# Patient Record
Sex: FEMALE | Race: WHITE | NOT HISPANIC OR LATINO | URBAN - METROPOLITAN AREA
[De-identification: names, ages, dates, MRNs, and addresses within clinical notes are randomized per-mention and may not be internally consistent; named-entity substitution may affect disease eponyms.]

---

## 2017-04-21 ENCOUNTER — OUTPATIENT (OUTPATIENT)
Dept: OUTPATIENT SERVICES | Facility: HOSPITAL | Age: 47
LOS: 1 days | End: 2017-04-21
Payer: MEDICAID

## 2017-04-21 ENCOUNTER — APPOINTMENT (OUTPATIENT)
Dept: MAMMOGRAPHY | Facility: IMAGING CENTER | Age: 47
End: 2017-04-21

## 2017-04-21 DIAGNOSIS — K63.9 DISEASE OF INTESTINE, UNSPECIFIED: Chronic | ICD-10-CM

## 2017-04-21 DIAGNOSIS — Z00.8 ENCOUNTER FOR OTHER GENERAL EXAMINATION: ICD-10-CM

## 2017-04-21 DIAGNOSIS — M12.9 ARTHROPATHY, UNSPECIFIED: Chronic | ICD-10-CM

## 2017-04-21 PROCEDURE — 77063 BREAST TOMOSYNTHESIS BI: CPT

## 2017-04-21 PROCEDURE — 77067 SCR MAMMO BI INCL CAD: CPT

## 2017-05-15 ENCOUNTER — APPOINTMENT (OUTPATIENT)
Dept: OBGYN | Facility: CLINIC | Age: 47
End: 2017-05-15

## 2017-10-10 ENCOUNTER — OUTPATIENT (OUTPATIENT)
Dept: OUTPATIENT SERVICES | Facility: HOSPITAL | Age: 47
LOS: 1 days | End: 2017-10-10
Payer: MEDICAID

## 2017-10-10 ENCOUNTER — APPOINTMENT (OUTPATIENT)
Dept: OBGYN | Facility: CLINIC | Age: 47
End: 2017-10-10
Payer: MEDICAID

## 2017-10-10 ENCOUNTER — APPOINTMENT (OUTPATIENT)
Dept: ULTRASOUND IMAGING | Facility: IMAGING CENTER | Age: 47
End: 2017-10-10
Payer: MEDICAID

## 2017-10-10 ENCOUNTER — APPOINTMENT (OUTPATIENT)
Dept: VASCULAR SURGERY | Facility: CLINIC | Age: 47
End: 2017-10-10

## 2017-10-10 VITALS
DIASTOLIC BLOOD PRESSURE: 80 MMHG | WEIGHT: 170 LBS | HEIGHT: 67.5 IN | BODY MASS INDEX: 26.37 KG/M2 | SYSTOLIC BLOOD PRESSURE: 107 MMHG

## 2017-10-10 DIAGNOSIS — Z01.419 ENCOUNTER FOR GYNECOLOGICAL EXAMINATION (GENERAL) (ROUTINE) W/OUT ABNORMAL FINDINGS: ICD-10-CM

## 2017-10-10 DIAGNOSIS — Z87.42 PERSONAL HISTORY OF OTHER DISEASES OF THE FEMALE GENITAL TRACT: ICD-10-CM

## 2017-10-10 DIAGNOSIS — Z00.8 ENCOUNTER FOR OTHER GENERAL EXAMINATION: ICD-10-CM

## 2017-10-10 DIAGNOSIS — R10.30 LOWER ABDOMINAL PAIN, UNSPECIFIED: ICD-10-CM

## 2017-10-10 DIAGNOSIS — M12.9 ARTHROPATHY, UNSPECIFIED: Chronic | ICD-10-CM

## 2017-10-10 DIAGNOSIS — Z09 ENCOUNTER FOR FOLLOW-UP EXAMINATION AFTER COMPLETED TREATMENT FOR CONDITIONS OTHER THAN MALIGNANT NEOPLASM: ICD-10-CM

## 2017-10-10 DIAGNOSIS — N83.201 UNSPECIFIED OVARIAN CYST, RIGHT SIDE: ICD-10-CM

## 2017-10-10 DIAGNOSIS — K63.9 DISEASE OF INTESTINE, UNSPECIFIED: Chronic | ICD-10-CM

## 2017-10-10 LAB
BILIRUB UR QL STRIP: NORMAL
GLUCOSE UR-MCNC: NORMAL
HCG UR QL: 0.2 EU/DL
HGB UR QL STRIP.AUTO: NORMAL
KETONES UR-MCNC: NORMAL
LEUKOCYTE ESTERASE UR QL STRIP: NORMAL
NITRITE UR QL STRIP: NORMAL
PH UR STRIP: 5.5
PROT UR STRIP-MCNC: NORMAL
SP GR UR STRIP: <1.005

## 2017-10-10 PROCEDURE — 76830 TRANSVAGINAL US NON-OB: CPT | Mod: 26

## 2017-10-10 PROCEDURE — 81003 URINALYSIS AUTO W/O SCOPE: CPT | Mod: QW

## 2017-10-10 PROCEDURE — 76856 US EXAM PELVIC COMPLETE: CPT | Mod: 26

## 2017-10-10 PROCEDURE — 76830 TRANSVAGINAL US NON-OB: CPT

## 2017-10-10 PROCEDURE — 99396 PREV VISIT EST AGE 40-64: CPT

## 2017-10-10 PROCEDURE — 76856 US EXAM PELVIC COMPLETE: CPT

## 2017-10-10 RX ORDER — OMEPRAZOLE 40 MG/1
40 CAPSULE, DELAYED RELEASE ORAL
Refills: 0 | Status: ACTIVE | COMMUNITY

## 2017-10-10 RX ORDER — FOLIC ACID 1 MG/1
1 TABLET ORAL
Refills: 0 | Status: ACTIVE | COMMUNITY

## 2017-10-10 RX ORDER — MULTIVITAMIN
TABLET,CHEWABLE ORAL
Refills: 0 | Status: ACTIVE | COMMUNITY

## 2017-10-10 RX ORDER — SUCRALFATE 1 G/10ML
1 SUSPENSION ORAL
Refills: 0 | Status: ACTIVE | COMMUNITY

## 2017-10-11 LAB
ALBUMIN SERPL ELPH-MCNC: 4.4 G/DL
ALP BLD-CCNC: 64 U/L
ALT SERPL-CCNC: 16 U/L
ANION GAP SERPL CALC-SCNC: 16 MMOL/L
AST SERPL-CCNC: 24 U/L
BILIRUB SERPL-MCNC: 0.2 MG/DL
BUN SERPL-MCNC: 9 MG/DL
CALCIUM SERPL-MCNC: 9.6 MG/DL
CHLORIDE SERPL-SCNC: 103 MMOL/L
CO2 SERPL-SCNC: 22 MMOL/L
CREAT SERPL-MCNC: 0.74 MG/DL
GLUCOSE SERPL-MCNC: 80 MG/DL
POTASSIUM SERPL-SCNC: 4.1 MMOL/L
PROT SERPL-MCNC: 7 G/DL
SODIUM SERPL-SCNC: 141 MMOL/L

## 2017-10-13 LAB
CYTOLOGY CVX/VAG DOC THIN PREP: NORMAL
HPV HIGH+LOW RISK DNA PNL CVX: NOT DETECTED

## 2017-10-30 ENCOUNTER — APPOINTMENT (OUTPATIENT)
Dept: SURGERY | Facility: CLINIC | Age: 47
End: 2017-10-30

## 2017-11-03 ENCOUNTER — APPOINTMENT (OUTPATIENT)
Dept: OBGYN | Facility: CLINIC | Age: 47
End: 2017-11-03

## 2017-11-30 ENCOUNTER — OTHER (OUTPATIENT)
Age: 47
End: 2017-11-30

## 2019-03-11 ENCOUNTER — INPATIENT (INPATIENT)
Facility: HOSPITAL | Age: 49
LOS: 0 days | Discharge: HOME | DRG: 389 | End: 2019-03-12
Attending: SURGERY | Admitting: SURGERY
Payer: MEDICAID

## 2019-03-11 VITALS
DIASTOLIC BLOOD PRESSURE: 79 MMHG | OXYGEN SATURATION: 99 % | WEIGHT: 166.89 LBS | SYSTOLIC BLOOD PRESSURE: 119 MMHG | TEMPERATURE: 98 F | RESPIRATION RATE: 20 BRPM | HEART RATE: 78 BPM

## 2019-03-11 DIAGNOSIS — K56.1 INTUSSUSCEPTION: ICD-10-CM

## 2019-03-11 DIAGNOSIS — M12.9 ARTHROPATHY, UNSPECIFIED: Chronic | ICD-10-CM

## 2019-03-11 DIAGNOSIS — K63.9 DISEASE OF INTESTINE, UNSPECIFIED: Chronic | ICD-10-CM

## 2019-03-11 LAB
ALBUMIN SERPL ELPH-MCNC: 4.5 G/DL — SIGNIFICANT CHANGE UP (ref 3.3–5)
ALP SERPL-CCNC: 53 U/L — SIGNIFICANT CHANGE UP (ref 40–120)
ALT FLD-CCNC: 17 U/L — SIGNIFICANT CHANGE UP (ref 10–45)
ANION GAP SERPL CALC-SCNC: 13 MMOL/L — SIGNIFICANT CHANGE UP (ref 5–17)
APPEARANCE UR: CLEAR — SIGNIFICANT CHANGE UP
APTT BLD: 32.5 SEC — SIGNIFICANT CHANGE UP (ref 27.5–36.3)
AST SERPL-CCNC: 21 U/L — SIGNIFICANT CHANGE UP (ref 10–40)
BACTERIA # UR AUTO: NEGATIVE — SIGNIFICANT CHANGE UP
BASE EXCESS BLDV CALC-SCNC: 2.2 MMOL/L — HIGH (ref -2–2)
BASOPHILS # BLD AUTO: 0.1 K/UL — SIGNIFICANT CHANGE UP (ref 0–0.2)
BASOPHILS NFR BLD AUTO: 0.6 % — SIGNIFICANT CHANGE UP (ref 0–2)
BILIRUB SERPL-MCNC: 0.4 MG/DL — SIGNIFICANT CHANGE UP (ref 0.2–1.2)
BILIRUB UR-MCNC: NEGATIVE — SIGNIFICANT CHANGE UP
BLD GP AB SCN SERPL QL: NEGATIVE — SIGNIFICANT CHANGE UP
BUN SERPL-MCNC: 9 MG/DL — SIGNIFICANT CHANGE UP (ref 7–23)
CA-I SERPL-SCNC: 1.24 MMOL/L — SIGNIFICANT CHANGE UP (ref 1.12–1.3)
CALCIUM SERPL-MCNC: 10.6 MG/DL — HIGH (ref 8.4–10.5)
CHLORIDE BLDV-SCNC: 104 MMOL/L — SIGNIFICANT CHANGE UP (ref 96–108)
CHLORIDE SERPL-SCNC: 100 MMOL/L — SIGNIFICANT CHANGE UP (ref 96–108)
CO2 BLDV-SCNC: 29 MMOL/L — SIGNIFICANT CHANGE UP (ref 22–30)
CO2 SERPL-SCNC: 27 MMOL/L — SIGNIFICANT CHANGE UP (ref 22–31)
COLOR SPEC: COLORLESS — SIGNIFICANT CHANGE UP
CREAT SERPL-MCNC: 0.6 MG/DL — SIGNIFICANT CHANGE UP (ref 0.5–1.3)
DIFF PNL FLD: NEGATIVE — SIGNIFICANT CHANGE UP
EOSINOPHIL # BLD AUTO: 0.1 K/UL — SIGNIFICANT CHANGE UP (ref 0–0.5)
EOSINOPHIL NFR BLD AUTO: 1.4 % — SIGNIFICANT CHANGE UP (ref 0–6)
EPI CELLS # UR: 0 /HPF — SIGNIFICANT CHANGE UP
GAS PNL BLDV: 136 MMOL/L — SIGNIFICANT CHANGE UP (ref 136–145)
GAS PNL BLDV: SIGNIFICANT CHANGE UP
GLUCOSE BLDV-MCNC: 68 MG/DL — LOW (ref 70–99)
GLUCOSE SERPL-MCNC: 99 MG/DL — SIGNIFICANT CHANGE UP (ref 70–99)
GLUCOSE UR QL: NEGATIVE — SIGNIFICANT CHANGE UP
HCG SERPL-ACNC: <2 MIU/ML — SIGNIFICANT CHANGE UP
HCG UR QL: NEGATIVE — SIGNIFICANT CHANGE UP
HCO3 BLDV-SCNC: 27 MMOL/L — SIGNIFICANT CHANGE UP (ref 21–29)
HCT VFR BLD CALC: 47 % — HIGH (ref 34.5–45)
HCT VFR BLDA CALC: 40 % — SIGNIFICANT CHANGE UP (ref 39–50)
HGB BLD CALC-MCNC: 13.2 G/DL — SIGNIFICANT CHANGE UP (ref 11.5–15.5)
HGB BLD-MCNC: 15.8 G/DL — HIGH (ref 11.5–15.5)
HYALINE CASTS # UR AUTO: 1 /LPF — SIGNIFICANT CHANGE UP (ref 0–2)
INR BLD: 1.13 RATIO — SIGNIFICANT CHANGE UP (ref 0.88–1.16)
KETONES UR-MCNC: SIGNIFICANT CHANGE UP
LACTATE BLDV-MCNC: 2.8 MMOL/L — HIGH (ref 0.7–2)
LEUKOCYTE ESTERASE UR-ACNC: NEGATIVE — SIGNIFICANT CHANGE UP
LIDOCAIN IGE QN: 22 U/L — SIGNIFICANT CHANGE UP (ref 7–60)
LYMPHOCYTES # BLD AUTO: 2.8 K/UL — SIGNIFICANT CHANGE UP (ref 1–3.3)
LYMPHOCYTES # BLD AUTO: 27 % — SIGNIFICANT CHANGE UP (ref 13–44)
MCHC RBC-ENTMCNC: 29.4 PG — SIGNIFICANT CHANGE UP (ref 27–34)
MCHC RBC-ENTMCNC: 33.6 GM/DL — SIGNIFICANT CHANGE UP (ref 32–36)
MCV RBC AUTO: 87.5 FL — SIGNIFICANT CHANGE UP (ref 80–100)
MONOCYTES # BLD AUTO: 0.9 K/UL — SIGNIFICANT CHANGE UP (ref 0–0.9)
MONOCYTES NFR BLD AUTO: 8.9 % — SIGNIFICANT CHANGE UP (ref 2–14)
NEUTROPHILS # BLD AUTO: 6.5 K/UL — SIGNIFICANT CHANGE UP (ref 1.8–7.4)
NEUTROPHILS NFR BLD AUTO: 62.1 % — SIGNIFICANT CHANGE UP (ref 43–77)
NITRITE UR-MCNC: NEGATIVE — SIGNIFICANT CHANGE UP
PCO2 BLDV: 46 MMHG — SIGNIFICANT CHANGE UP (ref 35–50)
PH BLDV: 7.38 — SIGNIFICANT CHANGE UP (ref 7.35–7.45)
PH UR: 6.5 — SIGNIFICANT CHANGE UP (ref 5–8)
PLATELET # BLD AUTO: 188 K/UL — SIGNIFICANT CHANGE UP (ref 150–400)
PO2 BLDV: 28 MMHG — SIGNIFICANT CHANGE UP (ref 25–45)
POTASSIUM BLDV-SCNC: 3.7 MMOL/L — SIGNIFICANT CHANGE UP (ref 3.5–5.3)
POTASSIUM SERPL-MCNC: 4.1 MMOL/L — SIGNIFICANT CHANGE UP (ref 3.5–5.3)
POTASSIUM SERPL-SCNC: 4.1 MMOL/L — SIGNIFICANT CHANGE UP (ref 3.5–5.3)
PROT SERPL-MCNC: 7.2 G/DL — SIGNIFICANT CHANGE UP (ref 6–8.3)
PROT UR-MCNC: NEGATIVE — SIGNIFICANT CHANGE UP
PROTHROM AB SERPL-ACNC: 12.9 SEC — SIGNIFICANT CHANGE UP (ref 10–12.9)
RBC # BLD: 5.37 M/UL — HIGH (ref 3.8–5.2)
RBC # FLD: 12.3 % — SIGNIFICANT CHANGE UP (ref 10.3–14.5)
RBC CASTS # UR COMP ASSIST: 1 /HPF — SIGNIFICANT CHANGE UP (ref 0–4)
RH IG SCN BLD-IMP: POSITIVE — SIGNIFICANT CHANGE UP
SAO2 % BLDV: 49 % — LOW (ref 67–88)
SODIUM SERPL-SCNC: 140 MMOL/L — SIGNIFICANT CHANGE UP (ref 135–145)
SP GR SPEC: 1.04 — HIGH (ref 1.01–1.02)
UROBILINOGEN FLD QL: NEGATIVE — SIGNIFICANT CHANGE UP
WBC # BLD: 10.4 K/UL — SIGNIFICANT CHANGE UP (ref 3.8–10.5)
WBC # FLD AUTO: 10.4 K/UL — SIGNIFICANT CHANGE UP (ref 3.8–10.5)
WBC UR QL: 1 /HPF — SIGNIFICANT CHANGE UP (ref 0–5)

## 2019-03-11 PROCEDURE — 99285 EMERGENCY DEPT VISIT HI MDM: CPT

## 2019-03-11 PROCEDURE — 74177 CT ABD & PELVIS W/CONTRAST: CPT | Mod: 26

## 2019-03-11 RX ORDER — SUCRALFATE 1 G
1 TABLET ORAL EVERY 6 HOURS
Qty: 0 | Refills: 0 | Status: DISCONTINUED | OUTPATIENT
Start: 2019-03-11 | End: 2019-03-12

## 2019-03-11 RX ORDER — MORPHINE SULFATE 50 MG/1
2 CAPSULE, EXTENDED RELEASE ORAL ONCE
Qty: 0 | Refills: 0 | Status: DISCONTINUED | OUTPATIENT
Start: 2019-03-11 | End: 2019-03-12

## 2019-03-11 RX ORDER — SODIUM CHLORIDE 9 MG/ML
1000 INJECTION, SOLUTION INTRAVENOUS ONCE
Qty: 0 | Refills: 0 | Status: COMPLETED | OUTPATIENT
Start: 2019-03-11 | End: 2019-03-11

## 2019-03-11 RX ORDER — PANTOPRAZOLE SODIUM 20 MG/1
40 TABLET, DELAYED RELEASE ORAL ONCE
Qty: 0 | Refills: 0 | Status: COMPLETED | OUTPATIENT
Start: 2019-03-11 | End: 2019-03-11

## 2019-03-11 RX ORDER — MORPHINE SULFATE 50 MG/1
4 CAPSULE, EXTENDED RELEASE ORAL ONCE
Qty: 0 | Refills: 0 | Status: DISCONTINUED | OUTPATIENT
Start: 2019-03-11 | End: 2019-03-11

## 2019-03-11 RX ORDER — LIDOCAINE 4 G/100G
15 CREAM TOPICAL ONCE
Qty: 0 | Refills: 0 | Status: COMPLETED | OUTPATIENT
Start: 2019-03-11 | End: 2019-03-11

## 2019-03-11 RX ORDER — ENOXAPARIN SODIUM 100 MG/ML
40 INJECTION SUBCUTANEOUS DAILY
Qty: 0 | Refills: 0 | Status: DISCONTINUED | OUTPATIENT
Start: 2019-03-11 | End: 2019-03-12

## 2019-03-11 RX ORDER — PANTOPRAZOLE SODIUM 20 MG/1
40 TABLET, DELAYED RELEASE ORAL DAILY
Qty: 0 | Refills: 0 | Status: DISCONTINUED | OUTPATIENT
Start: 2019-03-11 | End: 2019-03-12

## 2019-03-11 RX ORDER — SODIUM CHLORIDE 9 MG/ML
1000 INJECTION, SOLUTION INTRAVENOUS
Qty: 0 | Refills: 0 | Status: DISCONTINUED | OUTPATIENT
Start: 2019-03-11 | End: 2019-03-12

## 2019-03-11 RX ADMIN — Medication 30 MILLILITER(S): at 22:46

## 2019-03-11 RX ADMIN — PANTOPRAZOLE SODIUM 40 MILLIGRAM(S): 20 TABLET, DELAYED RELEASE ORAL at 21:19

## 2019-03-11 RX ADMIN — SODIUM CHLORIDE 1000 MILLILITER(S): 9 INJECTION, SOLUTION INTRAVENOUS at 21:20

## 2019-03-11 RX ADMIN — MORPHINE SULFATE 4 MILLIGRAM(S): 50 CAPSULE, EXTENDED RELEASE ORAL at 21:20

## 2019-03-11 RX ADMIN — SODIUM CHLORIDE 75 MILLILITER(S): 9 INJECTION, SOLUTION INTRAVENOUS at 23:41

## 2019-03-11 RX ADMIN — MORPHINE SULFATE 4 MILLIGRAM(S): 50 CAPSULE, EXTENDED RELEASE ORAL at 21:53

## 2019-03-11 RX ADMIN — SODIUM CHLORIDE 1000 MILLILITER(S): 9 INJECTION, SOLUTION INTRAVENOUS at 22:13

## 2019-03-11 RX ADMIN — Medication 1 GRAM(S): at 22:45

## 2019-03-11 RX ADMIN — LIDOCAINE 15 MILLILITER(S): 4 CREAM TOPICAL at 22:46

## 2019-03-11 NOTE — ED PROVIDER NOTE - OBJECTIVE STATEMENT
49yo F hx obesity, gastric ulcer, s/p gastric bypass in 12/2003, SBO, infectious colitis, factor V leiden mutation, DVT on right leg in 2012, bipolar d/o, asthma, presents with severe epigastric abdominal pain. denies n/v, BM WNL, TUMS/Carafate/NSAIDs last week for relief. history of prior jejuno-jejunal intususseption, current symptoms are similar. Scheduled for GI scope tomorrow.

## 2019-03-11 NOTE — ED STATDOCS - OBJECTIVE STATEMENT
49 y/o female with pmhx of DVT, bipolar disorder, asthma, reactive hypoglycemia, factor 5 Leiden mutation, and pshx of gastric bypass and abdominoplasty c/o severe epigastric pain radiating to the back x1week. Pt primarily has abd pain when eating. States that she has a hx of intussusception and that current pain feel similar. Notes that she has been on Aleve for knee pain for x3 days, Tums (approximately 1/2 bottle) since last night and Xanax today for abd pain. BMs are normal. Denies N/V. Pt is on schedule for an endoscopy tomorrow. Allergic to Naldecon.

## 2019-03-11 NOTE — ED PROVIDER NOTE - PHYSICAL EXAMINATION
Gen: NAD  Head: NCAT  HEENT: PERRL, MMM, normal conjunctiva, anicteric, neck supple  Lung: CTAB, no adventitious sounds  CV: RRR, no murmurs, rubs or gallops  Abd: soft, +epigastric TTP, no rebound or guarding, no CVAT  MSK: No edema, no visible deformities  Neuro: No focal neurologic deficits. CN II-XII grossly intact. 5/5 strength and normal sensation in all extremities.  Skin: Warm and dry, no evidence of rash  Psych: normal mood and affect

## 2019-03-11 NOTE — CONSULT NOTE ADULT - ASSESSMENT
48 year old female with PMHx of obesity, gastric ulcer, s/p gastric bypass in 12/2003, SBO, infectious colitis, factor V leiden mutation, DVT on right leg in 2012, bipolar d/o, asthma, presents to ER with c/o severe epigastric abdominal pain without nausea or vomiting x ~1 week, worsening.  Last week was taking NSAIDS  for right knee pain (Alleve x 3 days) as well as prior history of gastric ulcer (clean based) as well as history of jejuno-jejunal intususseption      Recs:  -check CBC, CMP, amylase, lipase, lactate  -IVF  -IV PPI  -recommend CT scan (r/o intususseption or internal hernia)  -Recommend bariatric surgery evaluation  -If CT negative, then will plan for EGD tomorrow (added on to schedule); NPO after MN for EGD    Discussed with pt, all questions answered    Thank you for the courtesy of this consult.  Keith Nielsen PA-C    [facility]  [phone]

## 2019-03-11 NOTE — ED ADULT NURSE NOTE - OBJECTIVE STATEMENT
Patient presented to ED ambulatory c/o left abdominal pain radiating to the back for a week, pain got gradually worse. Patient has a history of Gastric Bypass 10 years ago.

## 2019-03-11 NOTE — ED STATDOCS - PMH
Anemia due to blood loss    Asthma (ICD9 493.90)    Bipolar Disorder, Unspecified (ICD9 296.80)    DVT (deep venous thrombosis), right  2012 - Right knee  Factor 5 Leiden mutation, heterozygous    History of Obesity (ICD9 V13.8)

## 2019-03-11 NOTE — ED STATDOCS - PSH
Arthropathy of shoulder region  Rotator cuff Right - 1997, Left 2013  Colon abnormality  Removal of scar tissue - 2009  S/P Abdominoplasty (ICD9 V45.89)    S/P Breast Augmentation (ICD9 V43.82)    S/P Gastric Bypass (ICD9 V45.86)

## 2019-03-11 NOTE — CONSULT NOTE ADULT - SUBJECTIVE AND OBJECTIVE BOX
Patient is a 48y old  Female who presents with a chief complaint of severe upper abdominal pain    HPI:  48 year old female with PMHx of obesity, gastric ulcer, s/p gastric bypass in 12/2003, SBO, infectious colitis, factor V leiden mutation, DVT on right leg in 2012, bipolar d/o, asthma, presents to ER with c/o severe epigastric abdominal pain without nausea or vomiting x ~1 week, worsening.  Last week was taking NSAIDS  for right knee pain (Alleve x 3 days) as well as prior history of gastric ulcer (clean based).  She reports decreased appetite but no emesis, stools have been brown in color.  She has been taking TUMS for relief and attempted to take Carafate slurry for relief. no baseline PPI Rx. +tobacco (active smoker)  Also has a history of prior jejuno-jejunal intususseption, current symptoms are similar to prior  episode as per pt.    Pain reported as 7-8/10  no melena or BRBPR, no fever or chills.     Primary GI: Dr ARAMIS Raymundo    PAST MEDICAL & SURGICAL HISTORY:  Anemia due to blood loss  DVT (deep venous thrombosis), right: 2012 - Right knee  Factor 5 Leiden mutation, heterozygous  History of Obesity (ICD9 V13.8)  Bipolar Disorder, Unspecified (ICD9 296.80)  Asthma (ICD9 493.90)  Colon abnormality: Removal of scar tissue - 2009  Arthropathy of shoulder region: Rotator cuff Right - 1997, Left 2013  S/P Breast Augmentation (ICD9 V43.82)  S/P Abdominoplasty (ICD9 V45.89)  S/P Gastric Bypass (ICD9 V45.86)  Jejuno-jejunal intususseption      Allergies  Naldecon-DX Childrens (Unknown)      MEDICATIONS  (STANDING):    MEDICATIONS  (PRN):      Social History:    Marital Status:  (   )    ( X  ) Single    (   )    (  )   Lives with: (  ) alone  (  ) children   (  ) spouse   (  ) parents  ( X ) other - significant other    Substance Use (street drugs): (X  ) never used  (  ) other:  Tobacco Usage:  (   ) never smoked   (   ) former smoker   (X   ) current smoker  (     ) pack year  (        ) last cigarette date  Alcohol Usage:    Family History   IBD (  ) Yes   ( X ) No  GI Malignancy (  )  Yes    (X  ) No    Health Management     Last Colonoscopy -      Advanced Directives: ( X    ) None    (      ) DNR    (     ) DNI    (     ) Health Care Proxy:     Review of Systems:    General:  No fevers, chills, night sweats, fatigue,   CV:  No pain, palpitations, hypo/hypertension  Resp:  No dyspnea, cough, tachypnea, wheezing  GI:  see HPI  :  No pain, bleeding, incontinence, nocturia  Muscle:  No pain, weakness  Neuro:  No weakness, tingling, memory problems  Psych:  No fatigue, insomnia, mood problems, depression  Endocrine:  No polyuria, polydypsia, cold/heat intolerance  Heme:  No petechiae, ecchymosis, easy bruisability  Skin:  No rash, tattoos, scars, edema      Vital Signs Last 24 Hrs  T(C): 36.6 (11 Mar 2019 15:03), Max: 36.6 (11 Mar 2019 15:03)  T(F): 97.9 (11 Mar 2019 15:03), Max: 97.9 (11 Mar 2019 15:03)  HR: 78 (11 Mar 2019 15:03) (78 - 78)  BP: 119/79 (11 Mar 2019 15:03) (119/79 - 119/79)  BP(mean): --  RR: 20 (11 Mar 2019 15:03) (20 - 20)  SpO2: 99% (11 Mar 2019 15:03) (99% - 99%)    PHYSICAL EXAM:    Constitutional: well-developed non toxic appearing but appears uncomfortable +odor of tobacco  Neck: No LAD, supple. no JVD  Respiratory: good air entry b/l, no wheeze  Cardiovascular: S1 and S2, RRR  Gastrointestinal: BS+, soft, ND +epigastric tenderness without rebound or rigidity,   Extremities: No peripheral edema, neg clubbing, cyanosis  Vascular: 2+ peripheral pulses  Neurological: A/O x 3, no focal deficits  Psychiatric: Normal mood, normal affect  Skin: No rashes, anicteric        LABS:    pending              RADIOLOGY & ADDITIONAL TESTS: Patient is a 48y old  Female who presents with a chief complaint of severe upper abdominal pain    HPI:  48 year old female with PMHx of obesity, gastric ulcer, s/p gastric bypass in 12/2003, SBO, infectious colitis, factor V leiden mutation, DVT on right leg in 2012, bipolar d/o, asthma, presents to ER with c/o severe epigastric abdominal pain without nausea or vomiting x ~1 week, worsening.  Last week was taking NSAIDS  for right knee pain (Alleve x 3 days) as well as prior history of gastric ulcer (clean based).  She reports decreased appetite but no emesis, stools have been brown in color.  She has been taking TUMS for relief and attempted to take Carafate slurry for relief. no baseline PPI Rx. +tobacco (active smoker)  Also has a history of prior jejuno-jejunal intususseption, current symptoms are similar to prior  episode as per pt.    Pain reported as 7-8/10  no melena or BRBPR, no fever or chills.     Primary GI: Dr ARAMIS Raymundo    PAST MEDICAL & SURGICAL HISTORY:  Anemia due to blood loss  DVT (deep venous thrombosis), right: 2012 - Right knee  Factor 5 Leiden mutation, heterozygous  History of Obesity (ICD9 V13.8)  Bipolar Disorder, Unspecified (ICD9 296.80)  Asthma (ICD9 493.90)  Colon abnormality: Removal of scar tissue - 2009  Arthropathy of shoulder region: Rotator cuff Right - 1997, Left 2013  S/P Breast Augmentation (ICD9 V43.82)  S/P Abdominoplasty (ICD9 V45.89)  S/P Gastric Bypass (ICD9 V45.86)  Jejuno-jejunal intususseption    Allergies  Naldecon-DX Childrens (Unknown)    MEDICATIONS  (STANDING):    MEDICATIONS  (PRN):    Social History:  Marital Status:  (   )    ( X  ) Single    (   )    (  )   Lives with: (  ) alone  (  ) children   (  ) spouse   (  ) parents  ( X ) other - significant other    Substance Use (street drugs): (X  ) never used  (  ) other:  Tobacco Usage:  (   ) never smoked   (   ) former smoker   (X   ) current smoker  (     ) pack year  (        ) last cigarette date  Alcohol Usage:    Family History   IBD (  ) Yes   ( X ) No  GI Malignancy (  )  Yes    (X  ) No    Advanced Directives: ( X    ) None    (      ) DNR    (     ) DNI    (     ) Health Care Proxy:     Review of Systems:  General:  No fevers, chills, night sweats, fatigue,   CV:  No pain, palpitations, hypo/hypertension  Resp:  No dyspnea, cough, tachypnea, wheezing  GI:  see HPI  :  No pain, bleeding, incontinence, nocturia  Muscle:  No pain, weakness  Neuro:  No weakness, tingling, memory problems  Psych:  No fatigue, insomnia, mood problems, depression  Endocrine:  No polyuria, polydypsia, cold/heat intolerance  Heme:  No petechiae, ecchymosis, easy bruisability  Skin:  No rash, tattoos, scars, edema    Vital Signs Last 24 Hrs  T(C): 36.6 (11 Mar 2019 15:03), Max: 36.6 (11 Mar 2019 15:03)  T(F): 97.9 (11 Mar 2019 15:03), Max: 97.9 (11 Mar 2019 15:03)  HR: 78 (11 Mar 2019 15:03) (78 - 78)  BP: 119/79 (11 Mar 2019 15:03) (119/79 - 119/79)  BP(mean): --  RR: 20 (11 Mar 2019 15:03) (20 - 20)  SpO2: 99% (11 Mar 2019 15:03) (99% - 99%)    PHYSICAL EXAM:  Constitutional: well-developed non toxic appearing but appears uncomfortable +odor of tobacco  Neck: No LAD, supple. no JVD  Respiratory: good air entry b/l, no wheeze  Cardiovascular: S1 and S2, RRR  Gastrointestinal: BS+, soft, ND +epigastric tenderness without rebound or rigidity,   Extremities: No peripheral edema, neg clubbing, cyanosis  Vascular: 2+ peripheral pulses  Neurological: A/O x 3, no focal deficits  Psychiatric: Normal mood, normal affect  Skin: No rashes, anicteric    LABS:    pending    RADIOLOGY & ADDITIONAL TESTS:

## 2019-03-11 NOTE — ED STATDOCS - NS_ ATTENDINGSCRIBEDETAILS _ED_A_ED_FT
The scribe's documentation has been prepared under my direction and personally reviewed by me in its entirety. I confirm that the note above accurately reflects all work, treatment, procedures, and medical decision making performed by me.  HANNAH Friedman MD

## 2019-03-11 NOTE — ED PROVIDER NOTE - NS ED ROS FT
ROS: denies HA, weakness, dizziness, fevers/chills, nausea/vomiting, chest pain, SOB, diaphoresis, diarrhea, joint pain, neuro deficits, dysuria/hematuria, rash    +abd pain

## 2019-03-11 NOTE — ED PROVIDER NOTE - ATTENDING CONTRIBUTION TO CARE
48F, pmh gastric bypass 2003, sbo, bipolar disorder, intussusception, presents with severe epigastric abd pain x 1 week, worsening with time. Worse with PO intake. No improvement with home OTC meds. Feels similar to previous intussusception. No n/v, melena, hematochezia. No urinary sx. No f/c.    PE: NAD, NCAT, MMM, Trachea midline, Normal conjunctiva, lungs CTAB, S1/S2 RRR, Normal perfusion, 2+ radial pulses bilat, Abdomen Soft, +Mid and epigastric abd ttp, No rebound/guarding, No LE edema, No deformity of extremities, No rashes,  No focal motor or sensory deficits.     Pt seen by qdoc, labs obtained, CT reveals intussusception. Surgery consult, dispo per surgery rec's. - Shankar Cantu MD

## 2019-03-12 ENCOUNTER — RESULT REVIEW (OUTPATIENT)
Age: 49
End: 2019-03-12

## 2019-03-12 ENCOUNTER — TRANSCRIPTION ENCOUNTER (OUTPATIENT)
Age: 49
End: 2019-03-12

## 2019-03-12 VITALS
RESPIRATION RATE: 18 BRPM | TEMPERATURE: 99 F | SYSTOLIC BLOOD PRESSURE: 108 MMHG | HEART RATE: 64 BPM | OXYGEN SATURATION: 96 % | DIASTOLIC BLOOD PRESSURE: 68 MMHG

## 2019-03-12 LAB
ANION GAP SERPL CALC-SCNC: 13 MMOL/L — SIGNIFICANT CHANGE UP (ref 5–17)
APTT BLD: 31.6 SEC — SIGNIFICANT CHANGE UP (ref 27.5–36.3)
BUN SERPL-MCNC: 6 MG/DL — LOW (ref 7–23)
CA-I BLD-SCNC: 1.19 MMOL/L — SIGNIFICANT CHANGE UP (ref 1.12–1.3)
CALCIUM SERPL-MCNC: 9.1 MG/DL — SIGNIFICANT CHANGE UP (ref 8.4–10.5)
CHLORIDE SERPL-SCNC: 104 MMOL/L — SIGNIFICANT CHANGE UP (ref 96–108)
CO2 SERPL-SCNC: 25 MMOL/L — SIGNIFICANT CHANGE UP (ref 22–31)
CREAT SERPL-MCNC: 0.56 MG/DL — SIGNIFICANT CHANGE UP (ref 0.5–1.3)
GLUCOSE BLDC GLUCOMTR-MCNC: 117 MG/DL — HIGH (ref 70–99)
GLUCOSE BLDC GLUCOMTR-MCNC: 119 MG/DL — HIGH (ref 70–99)
GLUCOSE BLDC GLUCOMTR-MCNC: 55 MG/DL — LOW (ref 70–99)
GLUCOSE BLDC GLUCOMTR-MCNC: 68 MG/DL — LOW (ref 70–99)
GLUCOSE SERPL-MCNC: 88 MG/DL — SIGNIFICANT CHANGE UP (ref 70–99)
HCT VFR BLD CALC: 40 % — SIGNIFICANT CHANGE UP (ref 34.5–45)
HGB BLD-MCNC: 13.1 G/DL — SIGNIFICANT CHANGE UP (ref 11.5–15.5)
INR BLD: 1.15 RATIO — SIGNIFICANT CHANGE UP (ref 0.88–1.16)
MAGNESIUM SERPL-MCNC: 1.7 MG/DL — SIGNIFICANT CHANGE UP (ref 1.6–2.6)
MCHC RBC-ENTMCNC: 28.8 PG — SIGNIFICANT CHANGE UP (ref 27–34)
MCHC RBC-ENTMCNC: 32.8 GM/DL — SIGNIFICANT CHANGE UP (ref 32–36)
MCV RBC AUTO: 87.9 FL — SIGNIFICANT CHANGE UP (ref 80–100)
PHOSPHATE SERPL-MCNC: 3.9 MG/DL — SIGNIFICANT CHANGE UP (ref 2.5–4.5)
PLATELET # BLD AUTO: 160 K/UL — SIGNIFICANT CHANGE UP (ref 150–400)
POTASSIUM SERPL-MCNC: 3.7 MMOL/L — SIGNIFICANT CHANGE UP (ref 3.5–5.3)
POTASSIUM SERPL-SCNC: 3.7 MMOL/L — SIGNIFICANT CHANGE UP (ref 3.5–5.3)
PROTHROM AB SERPL-ACNC: 13.3 SEC — HIGH (ref 10–13.1)
RBC # BLD: 4.55 M/UL — SIGNIFICANT CHANGE UP (ref 3.8–5.2)
RBC # FLD: 13.4 % — SIGNIFICANT CHANGE UP (ref 10.3–14.5)
SODIUM SERPL-SCNC: 142 MMOL/L — SIGNIFICANT CHANGE UP (ref 135–145)
WBC # BLD: 6.52 K/UL — SIGNIFICANT CHANGE UP (ref 3.8–10.5)
WBC # FLD AUTO: 6.52 K/UL — SIGNIFICANT CHANGE UP (ref 3.8–10.5)

## 2019-03-12 PROCEDURE — 88305 TISSUE EXAM BY PATHOLOGIST: CPT | Mod: 26

## 2019-03-12 PROCEDURE — 88312 SPECIAL STAINS GROUP 1: CPT | Mod: 26

## 2019-03-12 RX ORDER — SUCRALFATE 1 G
10 TABLET ORAL
Qty: 1800 | Refills: 0 | OUTPATIENT
Start: 2019-03-12 | End: 2019-04-10

## 2019-03-12 RX ORDER — INFLUENZA VIRUS VACCINE 15; 15; 15; 15 UG/.5ML; UG/.5ML; UG/.5ML; UG/.5ML
0.5 SUSPENSION INTRAMUSCULAR ONCE
Qty: 0 | Refills: 0 | Status: COMPLETED | OUTPATIENT
Start: 2019-03-12 | End: 2019-03-12

## 2019-03-12 RX ORDER — SIMETHICONE 80 MG/1
1 TABLET, CHEWABLE ORAL
Qty: 90 | Refills: 0 | OUTPATIENT
Start: 2019-03-12 | End: 2019-04-10

## 2019-03-12 RX ORDER — MORPHINE SULFATE 50 MG/1
2 CAPSULE, EXTENDED RELEASE ORAL ONCE
Qty: 0 | Refills: 0 | Status: DISCONTINUED | OUTPATIENT
Start: 2019-03-12 | End: 2019-03-12

## 2019-03-12 RX ORDER — PNEUMOCOCCAL 23-VAL P-SAC VAC 25MCG/0.5
0.5 VIAL (ML) INJECTION ONCE
Qty: 0 | Refills: 0 | Status: COMPLETED | OUTPATIENT
Start: 2019-03-12 | End: 2019-03-12

## 2019-03-12 RX ADMIN — Medication 1 GRAM(S): at 13:29

## 2019-03-12 RX ADMIN — MORPHINE SULFATE 2 MILLIGRAM(S): 50 CAPSULE, EXTENDED RELEASE ORAL at 07:50

## 2019-03-12 RX ADMIN — MORPHINE SULFATE 2 MILLIGRAM(S): 50 CAPSULE, EXTENDED RELEASE ORAL at 01:06

## 2019-03-12 RX ADMIN — ENOXAPARIN SODIUM 40 MILLIGRAM(S): 100 INJECTION SUBCUTANEOUS at 13:29

## 2019-03-12 RX ADMIN — PANTOPRAZOLE SODIUM 40 MILLIGRAM(S): 20 TABLET, DELAYED RELEASE ORAL at 13:28

## 2019-03-12 RX ADMIN — MORPHINE SULFATE 2 MILLIGRAM(S): 50 CAPSULE, EXTENDED RELEASE ORAL at 00:20

## 2019-03-12 RX ADMIN — INFLUENZA VIRUS VACCINE 0.5 MILLILITER(S): 15; 15; 15; 15 SUSPENSION INTRAMUSCULAR at 15:34

## 2019-03-12 RX ADMIN — MORPHINE SULFATE 2 MILLIGRAM(S): 50 CAPSULE, EXTENDED RELEASE ORAL at 08:25

## 2019-03-12 RX ADMIN — Medication 0.5 MILLILITER(S): at 15:35

## 2019-03-12 NOTE — H&P ADULT - NSHPPHYSICALEXAM_GEN_ALL_CORE
Vital Signs Last 24 Hrs  T(C): 36.6 (12 Mar 2019 00:10), Max: 36.8 (11 Mar 2019 21:15)  T(F): 97.9 (12 Mar 2019 00:10), Max: 98.3 (11 Mar 2019 21:15)  HR: 54 (12 Mar 2019 00:10) (54 - 78)  BP: 129/84 (12 Mar 2019 00:10) (119/79 - 151/91)  BP(mean): --  RR: 17 (12 Mar 2019 00:10) (17 - 20)  SpO2: 97% (12 Mar 2019 00:10) (97% - 99%)    GEN: NAD, resting quietly  PULM: symmetric chest rise bilaterally, no increased WOB  CV: regular rate, peripheral pulses intact  ABD: soft, non-distended, minimal epigastric tenderness  EXTR: no cyanosis or edema, moving all extremities

## 2019-03-12 NOTE — DISCHARGE NOTE PROVIDER - NSDCCPCAREPLAN_GEN_ALL_CORE_FT
PRINCIPAL DISCHARGE DIAGNOSIS  Problem: Intussusception  Assessment and Plan of Treatment: Please follow up with GI and Dr To as needed

## 2019-03-12 NOTE — DISCHARGE NOTE PROVIDER - CARE PROVIDERS DIRECT ADDRESSES
[email or web address]

## 2019-03-12 NOTE — H&P ADULT - ASSESSMENT
49 yo woman with a hx of gastric bypass (2003), previous jejuno-jejunal intussusception SBO, marginal ulcer, factor V leiden mutation, right leg DVT in 2012, obesity, bipolar disorder presents with epigastric abdominal pain. CT scan demonstrates a short-segment small bowel-small bowel intussusception in the LUQ.    - admit to green team, [id number]  - AM labs including lactate  - NPO  - IVF, D5 1/2 NS @ 75 cc/hr  - protonix IV  - standing carafate liquid  - lovenox for VTE PPx  - plan for EGD tomorrow. Patient added on to schedule    Patient discussed with Dr. Ambriz on behalf of Dr. English 47 yo woman with a hx of gastric bypass (2003), previous jejuno-jejunal intussusception SBO, marginal ulcer, factor V leiden mutation, right leg DVT in 2012, obesity, bipolar disorder presents with epigastric abdominal pain. CT scan demonstrates a short-segment small bowel-small bowel intussusception in the LUQ. The patient does not present with obstructive symptoms, and has a known history of previous intussusception. Plan for repeat CT A/P without contrast tomorrow, as this finding may be incidental. Otherwise, the patient will benefit from EGD as planned.    - admit to green team, [id number]  - repeat CT A/P without contrast tomorrow to re-evaluate intussusception  - AM labs including lactate  - NPO  - IVF, D5 1/2 NS @ 75 cc/hr  - protonix IV  - standing carafate liquid  - lovenox for VTE PPx  - plan for EGD tomorrow. Patient added on to schedule    Patient discussed with Dr. Ambriz on behalf of Dr. English

## 2019-03-12 NOTE — DISCHARGE NOTE NURSING/CASE MANAGEMENT/SOCIAL WORK - NSDCDPATPORTLINK_GEN_ALL_CORE
You can access the [facility] Patient Portal, offered by [facility], by registering with the following website: [email or web address]

## 2019-03-12 NOTE — H&P ADULT - HISTORY OF PRESENT ILLNESS
49 yo woman with a hx of gastric bypass (2003), previous jejuno-jejunal intussusception SBO, marginal ulcer, factor V leiden mutation, right leg DVT in 2012, obesity, bipolar disorder presenting with epigastric abdominal pain x1 week without associated symptoms. She denies nausea or vomiting; last BM was yesterday and normal. She denies fever, chills, or blood per rectum, but does report decreased appetite for the past week.    Of note, the patient is added on to the schedule for EGD tomorrow with gastroenterology.    ED Course: Afebrile, VSS. Received 1L LR bolus and GI cocktail (maalox, viscous lidocaine, protonix).

## 2019-03-12 NOTE — DISCHARGE NOTE NURSING/CASE MANAGEMENT/SOCIAL WORK - NSDCVIVACCINE_GEN_ALL_CORE_FT
Influenza , 2019/3/12 15:34 , Lila Pederson (RN)  Pneumococcal polysaccharide (PPSV23) , 2019/3/12 15:35 , Lila Pederson (RN)

## 2019-03-12 NOTE — PROGRESS NOTE ADULT - SUBJECTIVE AND OBJECTIVE BOX
Pre-Endoscopy Evaluation      Referring Physician:  dr. suresh jalloh                                  Procedure:  upper gastrointestinal endoscopy     Indication for Procedure: abdominal pain, h/o gastric ulcer    Pertinent History: 48 year old female with PMHx of obesity, gastric ulcer, s/p gastric bypass in 12/2003, SBO, infectious colitis, factor V leiden mutation, DVT on right leg in 2012, bipolar d/o, asthma, gastric ulcer presents to ER with c/o severe epigastric pain  Last week was taking NSAIDS  for right knee pain       Sedation by Anesthesia [X]    PAST MEDICAL & SURGICAL HISTORY:  Anemia due to blood loss  DVT (deep venous thrombosis), right: 2012 - Right knee  Factor 5 Leiden mutation, heterozygous  History of Obesity   Bipolar Disorder  Asthma   Colon abnormality: Removal of scar tissue - 2009  Arthropathy of shoulder region: Rotator cuff Right - 1997, Left 2013  S/P Breast Augmentation   S/P Abdominoplasty   S/P Gastric Bypass   Gastic Ulcer  Jejuno-jejunal intususseption    PMH of Gastroparesis [ ]  Gastric Surgery [x]  Gastric Outlet Obstruction [ ]    Allergies:    Naldecon-DX Childrens (Unknown)    Intolerances:    Latex allergy: [ ] yes [X] no    Medications:MEDICATIONS  (STANDING):  dextrose 5% + sodium chloride 0.45%. 1000 milliLiter(s) (75 mL/Hr) IV Continuous <Continuous>  enoxaparin Injectable 40 milliGRAM(s) SubCutaneous daily  influenza   Vaccine 0.5 milliLiter(s) IntraMuscular once  pantoprazole  Injectable 40 milliGRAM(s) IV Push daily  sucralfate suspension 1 Gram(s) Oral every 6 hours    MEDICATIONS  (PRN):      Smoking: [ ] yes  [X] no    AICD/PPM: [ ] yes   [X] no    Pertinent lab data:                        15.8   10.4  )-----------( 188      ( 11 Mar 2019 18:46 )             47.0     03-12    142  |  104  |  6<L>  ----------------------------<  88  3.7   |  25  |  0.56    Ca    9.1      12 Mar 2019 07:13  Phos  3.9     03-12  Mg     1.7     03-12    TPro  7.2  /  Alb  4.5  /  TBili  0.4  /  DBili  x   /  AST  21  /  ALT  17  /  AlkPhos  53  03-11    PT/INR - ( 11 Mar 2019 18:46 )   PT: 12.9 sec;   INR: 1.13 ratio    PTT - ( 11 Mar 2019 18:46 )  PTT:32.5 sec        HCG Quantitative, Serum: <2.0 mIU/mL (03-11 @ 18:46)      < from: CT Abdomen and Pelvis w/ Oral Cont and w/ IV Cont (03.11.19 @ 19:43) >    BOWEL: Gastric bypass. No bowel obstruction. There is a 5 cm short   segment small bowel intussusception in the left upper quadrant at the   level of the distal small bowel anastomosis. There is no associated bowel   wall edema or bowel obstruction. Findings are of indeterminate   significance. Sigmoid diverticulosis.  PERITONEUM: No ascites.  VESSELS:  Within normal limits.  RETROPERITONEUM: No lymphadenopathy.    ABDOMINAL WALL: Within normal limits.  BONES: Mild degenerative changes of the spine.    IMPRESSION: Nonobstructing short segment small bowel intussusception in   the left upper quadrant.  ------------------------------------------------------------------------------------------------      Physical Examination:    Daily   Vital Signs Last 24 Hrs  T(C): 36.7 (12 Mar 2019 05:35), Max: 36.8 (11 Mar 2019 21:15)  T(F): 98 (12 Mar 2019 05:35), Max: 98.3 (11 Mar 2019 21:15)  HR: 55 (12 Mar 2019 05:35) (54 - 78)  BP: 111/73 (12 Mar 2019 05:35) (111/73 - 151/91)  BP(mean): --  RR: 16 (12 Mar 2019 05:35) (16 - 20)  SpO2: 97% (12 Mar 2019 05:35) (97% - 99%)    Drug Dosing Weight    Weight (kg): 75.7 (11 Mar 2019 15:03)    Constitutional: NAD     Neck:  No JVD    Respiratory: CTAB/L    Cardiovascular: S1 and S2    Gastrointestinal: BS+, soft, NT/ND    Extremities: No peripheral edema    Neurological: A/O x 3    : No Ulloa    Skin: No rashes    Comments:    ASA Class: I [ ]  II [x]  III [ ]  IV [ ]    The patient is a suitable candidate for the planned procedure unless box checked [ ]  No, explain: Pre-Endoscopy Evaluation      Referring Physician:  dr. suresh jalloh                                  Procedure:  upper gastrointestinal endoscopy     Indication for Procedure: abdominal pain, h/o gastric ulcer    Pertinent History: 48 year old female with PMHx of obesity, gastric ulcer, s/p gastric bypass in 12/2003, SBO, infectious colitis, factor V leiden mutation, DVT on right leg in 2012, bipolar d/o, asthma, gastric ulcer presents to ER with c/o severe epigastric pain  Last week was taking NSAIDS  for right knee pain       Sedation by Anesthesia [X]    PAST MEDICAL & SURGICAL HISTORY:  Anemia due to blood loss  DVT (deep venous thrombosis), right: 2012 - Right knee  Factor 5 Leiden mutation, heterozygous  History of Obesity   Bipolar Disorder  Asthma   Colon abnormality: Removal of scar tissue - 2009  Arthropathy of shoulder region: Rotator cuff Right - 1997, Left 2013  S/P Breast Augmentation   S/P Abdominoplasty   S/P Gastric Bypass   Gastic Ulcer  Jejuno-jejunal intususseption    PMH of Gastroparesis [ ]  Gastric Surgery [x]  Gastric Outlet Obstruction [ ]    Allergies:    Naldecon-DX Childrens (Unknown)    Intolerances:    Latex allergy: [ ] yes [X] no    Medications:MEDICATIONS  (STANDING):  dextrose 5% + sodium chloride 0.45%. 1000 milliLiter(s) (75 mL/Hr) IV Continuous <Continuous>  enoxaparin Injectable 40 milliGRAM(s) SubCutaneous daily  influenza   Vaccine 0.5 milliLiter(s) IntraMuscular once  pantoprazole  Injectable 40 milliGRAM(s) IV Push daily  sucralfate suspension 1 Gram(s) Oral every 6 hours    MEDICATIONS  (PRN):      Smoking: [ ] yes  [X] no    AICD/PPM: [ ] yes   [X] no    Pertinent lab data:                        15.8   10.4  )-----------( 188      ( 11 Mar 2019 18:46 )             47.0     03-12    142  |  104  |  6<L>  ----------------------------<  88  3.7   |  25  |  0.56    Ca    9.1      12 Mar 2019 07:13  Phos  3.9     03-12  Mg     1.7     03-12    TPro  7.2  /  Alb  4.5  /  TBili  0.4  /  DBili  x   /  AST  21  /  ALT  17  /  AlkPhos  53  03-11    PT/INR - ( 11 Mar 2019 18:46 )   PT: 12.9 sec;   INR: 1.13 ratio    PTT - ( 11 Mar 2019 18:46 )  PTT:32.5 sec        HCG Quantitative, Serum: <2.0 mIU/mL (03-11 @ 18:46)      < from: CT Abdomen and Pelvis w/ Oral Cont and w/ IV Cont (03.11.19 @ 19:43) >    BOWEL: Gastric bypass. No bowel obstruction. There is a 5 cm short   segment small bowel intussusception in the left upper quadrant at the   level of the distal small bowel anastomosis. There is no associated bowel   wall edema or bowel obstruction. Findings are of indeterminate   significance. Sigmoid diverticulosis.  PERITONEUM: No ascites.  VESSELS:  Within normal limits.  RETROPERITONEUM: No lymphadenopathy.    ABDOMINAL WALL: Within normal limits.  BONES: Mild degenerative changes of the spine.    IMPRESSION: Nonobstructing short segment small bowel intussusception in   the left upper quadrant.  ------------------------------------------------------------------------------------------------      Physical Examination:    Daily   Vital Signs Last 24 Hrs  T(C): 36.7 (12 Mar 2019 05:35), Max: 36.8 (11 Mar 2019 21:15)  T(F): 98 (12 Mar 2019 05:35), Max: 98.3 (11 Mar 2019 21:15)  HR: 55 (12 Mar 2019 05:35) (54 - 78)  BP: 111/73 (12 Mar 2019 05:35) (111/73 - 151/91)  BP(mean): --  RR: 16 (12 Mar 2019 05:35) (16 - 20)  SpO2: 97% (12 Mar 2019 05:35) (97% - 99%)    Drug Dosing Weight    Weight (kg): 75.7 (11 Mar 2019 15:03)    Constitutional: NAD     Neck:  No JVD    Respiratory: CTAB/L    Cardiovascular: S1 and S2    Gastrointestinal: BS+, soft, NT/ND    Extremities: No peripheral edema    Neurological: A/O x 3    : No Ulloa    Skin: No rashes    Comments:    ASA Class: I [ ]  II [x]  III [ ]  IV [ ]

## 2019-03-12 NOTE — DISCHARGE NOTE PROVIDER - CARE PROVIDER_API CALL
Yoon Gutierrez)  Gastroenterology; Internal Medicine  [address], [address]  [place], [place] [id number]  Phone: [phone]  Fax: [phone]  Follow Up Time:     Dexter English)  Surgery  [address], [address]  [place] [address]  Phone: [phone]  Fax: [phone]  Follow Up Time:

## 2019-03-12 NOTE — H&P ADULT - NSHPLABSRESULTS_GEN_ALL_CORE
CBC (03-11 @ 18:46)                              15.8<H>                         10.4    )----------------(  188        62.1  % Neutrophils, 27.0  % Lymphocytes, ANC: 6.5                                 47.0<H>                BMP (03-11 @ 18:46)             140     |  100     |  9     		Ca++ --      Ca 10.6<H>             ---------------------------------( 99    		Mg --                 4.1     |  27      |  0.60  			Ph --        LFTs (03-11 @ 18:46)      TPro 7.2 / Alb 4.5 / TBili 0.4 / DBili -- / AST 21 / ALT 17 / AlkPhos 53    Coags (03-11 @ 18:46)  aPTT 32.5 / INR 1.13 / PT 12.9    ABG (03-11 @ 22:22)      /  /  /  /  / %     Lactate:   2.8<H>    VBG (03-11 @ 22:22)     7.38 / 46 / 28 / 27 / 2.2<H> / 49<L>%      IMAGING:  IMPRESSION: Nonobstructing short segment small bowel intussusception in   the left upper quadrant.    < end of copied text >

## 2019-03-12 NOTE — DISCHARGE NOTE PROVIDER - HOSPITAL COURSE
49 yo woman with a hx of gastric bypass (2003), previous jejuno-jejunal intussusception SBO, marginal ulcer, factor V leiden mutation, right leg DVT in 2012, obesity, bipolar disorder presents with epigastric abdominal pain. CT scan demonstrates a short-segment small bowel-small bowel intussusception in the LUQ. The patient does not present with obstructive symptoms and has a known history of previous intussusception.  GI was consulted and an EGD performed 3/12 showed      - Normal esophagus.- Gastric bypass with a normal-sized pouch and intact staple line.-  Gastrojejunal anastomosis characterized by ulceration. - No specimens collected.  Diet was advanced and patient was clear for discharge home.

## 2019-03-12 NOTE — DISCHARGE NOTE NURSING/CASE MANAGEMENT/SOCIAL WORK - NSDCPEPTCAREGIVEDUMATLIST _GEN_ALL_CORE
Influenza Vaccination
5 yo F with ADHD, multiple food allergies and mild intermittent asthma presents with headache for the last 3 days associated with worsening tremors/shaking/unsteadiness. Exam positive for Dysmetria ; mild tremors b/l ; Shaking episodes with active movements and ataxic gait; unable to support her self. MRI brain normal.  CSF shows 31 cells ( lymphocyte predominant with few neutrophils ) with normal glucose and protein. Normal VEEG.

## 2019-03-12 NOTE — H&P ADULT - ATTENDING COMMENTS
I saw and examined the patient, and reviewed  the history and data with the patient and staff  Agree with note which was also reviewed and edited where appropriate.  D/W patient, RN, residents and Fellow    Feels better.  Pt to have EGD to r/o ulcer, doubt clinical significance of intussusception.

## 2019-03-13 ENCOUNTER — APPOINTMENT (OUTPATIENT)
Dept: ORTHOPEDIC SURGERY | Facility: CLINIC | Age: 49
End: 2019-03-13

## 2019-03-13 LAB — SURGICAL PATHOLOGY STUDY: SIGNIFICANT CHANGE UP

## 2019-04-30 ENCOUNTER — APPOINTMENT (OUTPATIENT)
Dept: SURGERY | Facility: CLINIC | Age: 49
End: 2019-04-30
Payer: MEDICAID

## 2019-04-30 VITALS
TEMPERATURE: 98.2 F | OXYGEN SATURATION: 98 % | SYSTOLIC BLOOD PRESSURE: 151 MMHG | HEIGHT: 67.5 IN | DIASTOLIC BLOOD PRESSURE: 95 MMHG

## 2019-04-30 DIAGNOSIS — K56.1 INTUSSUSCEPTION: ICD-10-CM

## 2019-04-30 DIAGNOSIS — E83.52 HYPERCALCEMIA: ICD-10-CM

## 2019-04-30 PROCEDURE — 99406 BEHAV CHNG SMOKING 3-10 MIN: CPT

## 2019-04-30 PROCEDURE — 99215 OFFICE O/P EST HI 40 MIN: CPT

## 2019-04-30 RX ORDER — MOMETASONE FUROATE 220 UG/1
INHALANT RESPIRATORY (INHALATION)
Refills: 0 | Status: DISCONTINUED | COMMUNITY
End: 2019-04-30

## 2019-04-30 NOTE — ASSESSMENT
[FreeTextEntry1] : 48 year old female who had intussusception with hospital visit  in March 2019.  \par Also, h/o marginal ulcer and reactive hypoglycemia (dumping), currently an active smoker.

## 2019-04-30 NOTE — PHYSICAL EXAM
[Normal Breath Sounds] : Normal breath sounds [Normal Heart Sounds] : normal heart sounds [Alert] : alert [Oriented to Person] : oriented to person [Oriented to Place] : oriented to place [Oriented to Time] : oriented to time [Calm] : calm [JVD] : no jugular venous distention  [Carotid Bruits] : no carotid bruits [Abdominal Masses] : Abdominal mass present [Tender] : was nontender [Enlarged] : not enlarged [de-identified] : well kept [de-identified] : Normoactive bowel sounds, no hepatosplenomegaly, no masses, non-tender. [de-identified] : understood consultation

## 2019-04-30 NOTE — PLAN
[FreeTextEntry1] : dietary evaluation for prevention of dumping (early and late), and intussusception.\par No smoking (discussed in great detail)\par f/u 3 months\par f/u with GI and nutrition

## 2019-04-30 NOTE — HISTORY OF PRESENT ILLNESS
[de-identified] : pt recently admitted with intussusception that resolved w/o surgical intervention.\par Denies dietary indiscretion, but being w/u for dumping syndrome\par currently an active smoker. \par Feels well now, w/o any GI issues.\par Seeing an endocrinologist in [place] for low blood sugar.

## 2019-07-10 PROCEDURE — 84100 ASSAY OF PHOSPHORUS: CPT

## 2019-07-10 PROCEDURE — 90732 PPSV23 VACC 2 YRS+ SUBQ/IM: CPT

## 2019-07-10 PROCEDURE — 80048 BASIC METABOLIC PNL TOTAL CA: CPT

## 2019-07-10 PROCEDURE — 85027 COMPLETE CBC AUTOMATED: CPT

## 2019-07-10 PROCEDURE — 82962 GLUCOSE BLOOD TEST: CPT

## 2019-07-10 PROCEDURE — 96375 TX/PRO/DX INJ NEW DRUG ADDON: CPT

## 2019-07-10 PROCEDURE — 83735 ASSAY OF MAGNESIUM: CPT

## 2019-07-10 PROCEDURE — 85014 HEMATOCRIT: CPT

## 2019-07-10 PROCEDURE — 82330 ASSAY OF CALCIUM: CPT

## 2019-07-10 PROCEDURE — 86901 BLOOD TYPING SEROLOGIC RH(D): CPT

## 2019-07-10 PROCEDURE — 80053 COMPREHEN METABOLIC PANEL: CPT

## 2019-07-10 PROCEDURE — 74177 CT ABD & PELVIS W/CONTRAST: CPT

## 2019-07-10 PROCEDURE — 85730 THROMBOPLASTIN TIME PARTIAL: CPT

## 2019-07-10 PROCEDURE — 90686 IIV4 VACC NO PRSV 0.5 ML IM: CPT

## 2019-07-10 PROCEDURE — 85610 PROTHROMBIN TIME: CPT

## 2019-07-10 PROCEDURE — 86900 BLOOD TYPING SEROLOGIC ABO: CPT

## 2019-07-10 PROCEDURE — 81001 URINALYSIS AUTO W/SCOPE: CPT

## 2019-07-10 PROCEDURE — 83690 ASSAY OF LIPASE: CPT

## 2019-07-10 PROCEDURE — 84702 CHORIONIC GONADOTROPIN TEST: CPT

## 2019-07-10 PROCEDURE — 99285 EMERGENCY DEPT VISIT HI MDM: CPT | Mod: 25

## 2019-07-10 PROCEDURE — 82947 ASSAY GLUCOSE BLOOD QUANT: CPT

## 2019-07-10 PROCEDURE — 88305 TISSUE EXAM BY PATHOLOGIST: CPT

## 2019-07-10 PROCEDURE — 88312 SPECIAL STAINS GROUP 1: CPT

## 2019-07-10 PROCEDURE — 82435 ASSAY OF BLOOD CHLORIDE: CPT

## 2019-07-10 PROCEDURE — 83605 ASSAY OF LACTIC ACID: CPT

## 2019-07-10 PROCEDURE — 81025 URINE PREGNANCY TEST: CPT

## 2019-07-10 PROCEDURE — 96374 THER/PROPH/DIAG INJ IV PUSH: CPT | Mod: XU

## 2019-07-10 PROCEDURE — 84295 ASSAY OF SERUM SODIUM: CPT

## 2019-07-10 PROCEDURE — 82803 BLOOD GASES ANY COMBINATION: CPT

## 2019-07-10 PROCEDURE — 86850 RBC ANTIBODY SCREEN: CPT

## 2019-07-10 PROCEDURE — 84132 ASSAY OF SERUM POTASSIUM: CPT

## 2019-08-13 ENCOUNTER — INPATIENT (INPATIENT)
Facility: HOSPITAL | Age: 49
LOS: 1 days | Discharge: HOME | DRG: 392 | End: 2019-08-15
Attending: SURGERY | Admitting: SURGERY
Payer: MEDICAID

## 2019-08-13 VITALS
DIASTOLIC BLOOD PRESSURE: 91 MMHG | HEART RATE: 84 BPM | HEIGHT: 67 IN | OXYGEN SATURATION: 98 % | RESPIRATION RATE: 18 BRPM | WEIGHT: 156.97 LBS | SYSTOLIC BLOOD PRESSURE: 121 MMHG | TEMPERATURE: 98 F

## 2019-08-13 DIAGNOSIS — K57.32 DIVERTICULITIS OF LARGE INTESTINE WITHOUT PERFORATION OR ABSCESS WITHOUT BLEEDING: ICD-10-CM

## 2019-08-13 DIAGNOSIS — R09.89 OTHER SPECIFIED SYMPTOMS AND SIGNS INVOLVING THE CIRCULATORY AND RESPIRATORY SYSTEMS: ICD-10-CM

## 2019-08-13 DIAGNOSIS — K63.9 DISEASE OF INTESTINE, UNSPECIFIED: Chronic | ICD-10-CM

## 2019-08-13 DIAGNOSIS — M12.9 ARTHROPATHY, UNSPECIFIED: Chronic | ICD-10-CM

## 2019-08-13 LAB
ALBUMIN SERPL ELPH-MCNC: 4.5 G/DL — SIGNIFICANT CHANGE UP (ref 3.3–5)
ALP SERPL-CCNC: 59 U/L — SIGNIFICANT CHANGE UP (ref 40–120)
ALT FLD-CCNC: 20 U/L — SIGNIFICANT CHANGE UP (ref 10–45)
ANION GAP SERPL CALC-SCNC: 16 MMOL/L — SIGNIFICANT CHANGE UP (ref 5–17)
APPEARANCE UR: CLEAR — SIGNIFICANT CHANGE UP
AST SERPL-CCNC: 18 U/L — SIGNIFICANT CHANGE UP (ref 10–40)
BASE EXCESS BLDV CALC-SCNC: 3 MMOL/L — HIGH (ref -2–2)
BASOPHILS # BLD AUTO: 0 K/UL — SIGNIFICANT CHANGE UP (ref 0–0.2)
BASOPHILS NFR BLD AUTO: 0.3 % — SIGNIFICANT CHANGE UP (ref 0–2)
BILIRUB SERPL-MCNC: 0.5 MG/DL — SIGNIFICANT CHANGE UP (ref 0.2–1.2)
BILIRUB UR-MCNC: NEGATIVE — SIGNIFICANT CHANGE UP
BUN SERPL-MCNC: 8 MG/DL — SIGNIFICANT CHANGE UP (ref 7–23)
CALCIUM SERPL-MCNC: 10.1 MG/DL — SIGNIFICANT CHANGE UP (ref 8.4–10.5)
CHLORIDE SERPL-SCNC: 101 MMOL/L — SIGNIFICANT CHANGE UP (ref 96–108)
CO2 BLDV-SCNC: 32 MMOL/L — HIGH (ref 22–30)
CO2 SERPL-SCNC: 24 MMOL/L — SIGNIFICANT CHANGE UP (ref 22–31)
COLOR SPEC: YELLOW — SIGNIFICANT CHANGE UP
CREAT SERPL-MCNC: 0.6 MG/DL — SIGNIFICANT CHANGE UP (ref 0.5–1.3)
DIFF PNL FLD: NEGATIVE — SIGNIFICANT CHANGE UP
EOSINOPHIL # BLD AUTO: 0.1 K/UL — SIGNIFICANT CHANGE UP (ref 0–0.5)
EOSINOPHIL NFR BLD AUTO: 0.7 % — SIGNIFICANT CHANGE UP (ref 0–6)
GLUCOSE SERPL-MCNC: 95 MG/DL — SIGNIFICANT CHANGE UP (ref 70–99)
GLUCOSE UR QL: NEGATIVE — SIGNIFICANT CHANGE UP
HCO3 BLDV-SCNC: 30 MMOL/L — HIGH (ref 21–29)
HCT VFR BLD CALC: 45.4 % — HIGH (ref 34.5–45)
HGB BLD-MCNC: 14 G/DL — SIGNIFICANT CHANGE UP (ref 11.5–15.5)
KETONES UR-MCNC: NEGATIVE — SIGNIFICANT CHANGE UP
LEUKOCYTE ESTERASE UR-ACNC: NEGATIVE — SIGNIFICANT CHANGE UP
LIDOCAIN IGE QN: 25 U/L — SIGNIFICANT CHANGE UP (ref 7–60)
LYMPHOCYTES # BLD AUTO: 2.5 K/UL — SIGNIFICANT CHANGE UP (ref 1–3.3)
LYMPHOCYTES # BLD AUTO: 24.4 % — SIGNIFICANT CHANGE UP (ref 13–44)
MCHC RBC-ENTMCNC: 27.3 PG — SIGNIFICANT CHANGE UP (ref 27–34)
MCHC RBC-ENTMCNC: 30.9 GM/DL — LOW (ref 32–36)
MCV RBC AUTO: 88.6 FL — SIGNIFICANT CHANGE UP (ref 80–100)
MONOCYTES # BLD AUTO: 1.2 K/UL — HIGH (ref 0–0.9)
MONOCYTES NFR BLD AUTO: 11.5 % — SIGNIFICANT CHANGE UP (ref 2–14)
NEUTROPHILS # BLD AUTO: 6.4 K/UL — SIGNIFICANT CHANGE UP (ref 1.8–7.4)
NEUTROPHILS NFR BLD AUTO: 63.1 % — SIGNIFICANT CHANGE UP (ref 43–77)
NITRITE UR-MCNC: NEGATIVE — SIGNIFICANT CHANGE UP
PCO2 BLDV: 58 MMHG — HIGH (ref 35–50)
PH BLDV: 7.34 — LOW (ref 7.35–7.45)
PH UR: 7 — SIGNIFICANT CHANGE UP (ref 5–8)
PLATELET # BLD AUTO: 156 K/UL — SIGNIFICANT CHANGE UP (ref 150–400)
PO2 BLDV: <20 MMHG — LOW (ref 25–45)
POTASSIUM SERPL-MCNC: 4.5 MMOL/L — SIGNIFICANT CHANGE UP (ref 3.5–5.3)
POTASSIUM SERPL-SCNC: 4.5 MMOL/L — SIGNIFICANT CHANGE UP (ref 3.5–5.3)
PROT SERPL-MCNC: 7.4 G/DL — SIGNIFICANT CHANGE UP (ref 6–8.3)
PROT UR-MCNC: NEGATIVE — SIGNIFICANT CHANGE UP
RBC # BLD: 5.13 M/UL — SIGNIFICANT CHANGE UP (ref 3.8–5.2)
RBC # FLD: 12.3 % — SIGNIFICANT CHANGE UP (ref 10.3–14.5)
SAO2 % BLDV: 24 % — LOW (ref 67–88)
SODIUM SERPL-SCNC: 141 MMOL/L — SIGNIFICANT CHANGE UP (ref 135–145)
SP GR SPEC: 1.01 — LOW (ref 1.01–1.02)
UROBILINOGEN FLD QL: NEGATIVE — SIGNIFICANT CHANGE UP
WBC # BLD: 10.2 K/UL — SIGNIFICANT CHANGE UP (ref 3.8–10.5)
WBC # FLD AUTO: 10.2 K/UL — SIGNIFICANT CHANGE UP (ref 3.8–10.5)

## 2019-08-13 PROCEDURE — 74177 CT ABD & PELVIS W/CONTRAST: CPT | Mod: 26

## 2019-08-13 PROCEDURE — 99285 EMERGENCY DEPT VISIT HI MDM: CPT

## 2019-08-13 RX ORDER — ACETAMINOPHEN 500 MG
650 TABLET ORAL EVERY 6 HOURS
Refills: 0 | Status: DISCONTINUED | OUTPATIENT
Start: 2019-08-13 | End: 2019-08-15

## 2019-08-13 RX ORDER — SODIUM CHLORIDE 9 MG/ML
1000 INJECTION INTRAMUSCULAR; INTRAVENOUS; SUBCUTANEOUS ONCE
Refills: 0 | Status: COMPLETED | OUTPATIENT
Start: 2019-08-13 | End: 2019-08-13

## 2019-08-13 RX ORDER — PANTOPRAZOLE SODIUM 20 MG/1
40 TABLET, DELAYED RELEASE ORAL DAILY
Refills: 0 | Status: DISCONTINUED | OUTPATIENT
Start: 2019-08-13 | End: 2019-08-13

## 2019-08-13 RX ORDER — SODIUM CHLORIDE 9 MG/ML
1000 INJECTION, SOLUTION INTRAVENOUS
Refills: 0 | Status: COMPLETED | OUTPATIENT
Start: 2019-08-13 | End: 2019-08-13

## 2019-08-13 RX ORDER — SODIUM CHLORIDE 9 MG/ML
1000 INJECTION, SOLUTION INTRAVENOUS
Refills: 0 | Status: DISCONTINUED | OUTPATIENT
Start: 2019-08-14 | End: 2019-08-15

## 2019-08-13 RX ORDER — ACETAMINOPHEN 500 MG
1000 TABLET ORAL ONCE
Refills: 0 | Status: COMPLETED | OUTPATIENT
Start: 2019-08-13 | End: 2019-08-13

## 2019-08-13 RX ORDER — ENOXAPARIN SODIUM 100 MG/ML
40 INJECTION SUBCUTANEOUS DAILY
Refills: 0 | Status: DISCONTINUED | OUTPATIENT
Start: 2019-08-13 | End: 2019-08-15

## 2019-08-13 RX ORDER — CIPROFLOXACIN LACTATE 400MG/40ML
400 VIAL (ML) INTRAVENOUS EVERY 12 HOURS
Refills: 0 | Status: DISCONTINUED | OUTPATIENT
Start: 2019-08-13 | End: 2019-08-15

## 2019-08-13 RX ORDER — METRONIDAZOLE 500 MG
500 TABLET ORAL ONCE
Refills: 0 | Status: COMPLETED | OUTPATIENT
Start: 2019-08-13 | End: 2019-08-13

## 2019-08-13 RX ORDER — MORPHINE SULFATE 50 MG/1
2 CAPSULE, EXTENDED RELEASE ORAL ONCE
Refills: 0 | Status: DISCONTINUED | OUTPATIENT
Start: 2019-08-13 | End: 2019-08-13

## 2019-08-13 RX ORDER — PANTOPRAZOLE SODIUM 20 MG/1
40 TABLET, DELAYED RELEASE ORAL
Refills: 0 | Status: DISCONTINUED | OUTPATIENT
Start: 2019-08-13 | End: 2019-08-15

## 2019-08-13 RX ORDER — MORPHINE SULFATE 50 MG/1
1 CAPSULE, EXTENDED RELEASE ORAL ONCE
Refills: 0 | Status: DISCONTINUED | OUTPATIENT
Start: 2019-08-13 | End: 2019-08-13

## 2019-08-13 RX ORDER — SUCRALFATE 1 G
1 TABLET ORAL EVERY 6 HOURS
Refills: 0 | Status: DISCONTINUED | OUTPATIENT
Start: 2019-08-13 | End: 2019-08-15

## 2019-08-13 RX ORDER — METRONIDAZOLE 500 MG
500 TABLET ORAL EVERY 8 HOURS
Refills: 0 | Status: DISCONTINUED | OUTPATIENT
Start: 2019-08-13 | End: 2019-08-15

## 2019-08-13 RX ORDER — SIMETHICONE 80 MG/1
80 TABLET, CHEWABLE ORAL THREE TIMES A DAY
Refills: 0 | Status: DISCONTINUED | OUTPATIENT
Start: 2019-08-13 | End: 2019-08-15

## 2019-08-13 RX ORDER — MORPHINE SULFATE 50 MG/1
4 CAPSULE, EXTENDED RELEASE ORAL ONCE
Refills: 0 | Status: DISCONTINUED | OUTPATIENT
Start: 2019-08-13 | End: 2019-08-13

## 2019-08-13 RX ADMIN — MORPHINE SULFATE 2 MILLIGRAM(S): 50 CAPSULE, EXTENDED RELEASE ORAL at 19:24

## 2019-08-13 RX ADMIN — ENOXAPARIN SODIUM 40 MILLIGRAM(S): 100 INJECTION SUBCUTANEOUS at 19:23

## 2019-08-13 RX ADMIN — Medication 1000 MILLIGRAM(S): at 21:39

## 2019-08-13 RX ADMIN — Medication 100 MILLIGRAM(S): at 21:09

## 2019-08-13 RX ADMIN — Medication 400 MILLIGRAM(S): at 21:09

## 2019-08-13 RX ADMIN — SIMETHICONE 80 MILLIGRAM(S): 80 TABLET, CHEWABLE ORAL at 21:18

## 2019-08-13 RX ADMIN — MORPHINE SULFATE 4 MILLIGRAM(S): 50 CAPSULE, EXTENDED RELEASE ORAL at 12:54

## 2019-08-13 RX ADMIN — Medication 100 MILLIGRAM(S): at 18:01

## 2019-08-13 RX ADMIN — Medication 200 MILLIGRAM(S): at 16:24

## 2019-08-13 RX ADMIN — SODIUM CHLORIDE 100 MILLILITER(S): 9 INJECTION, SOLUTION INTRAVENOUS at 21:08

## 2019-08-13 RX ADMIN — MORPHINE SULFATE 2 MILLIGRAM(S): 50 CAPSULE, EXTENDED RELEASE ORAL at 19:54

## 2019-08-13 RX ADMIN — SODIUM CHLORIDE 1000 MILLILITER(S): 9 INJECTION INTRAMUSCULAR; INTRAVENOUS; SUBCUTANEOUS at 12:55

## 2019-08-13 NOTE — CONSULT NOTE ADULT - ASSESSMENT
48 year old female with PMHx of obesity, gastric ulcer, s/p gastric bypass in 12/2003, s/p abdominoplasty,  SBO, infectious colitis, factor V leiden mutation, DVT on right leg in 2012, bipolar d/o, asthma, presents to ER with c/o severe LLQ abdominal pain without nausea or vomiting x2-3 days worsening with associated constipation and colicky discomfort    r/o Colitis, Diverticulitis  r/o intussuseption or inguinal hernia (not vomiting, but in DDx given clinical history)    RECS:  -IVF  -CT scan as ordered  -NPO for now pending CT results  -IF CT scan with acute surgical pathology, then please contact Bariatric service (Yousuf et al)  -pain management    Discussed with ER team  Discussed with pt, all questions answered    Thank you for the courtesy of this consult.    Keith Nielsen PA-C    [facility]  [phone]  After hours and weekend coverage [phone]

## 2019-08-13 NOTE — ED ADULT NURSE NOTE - NSIMPLEMENTINTERV_GEN_ALL_ED
Implemented All Universal Safety Interventions:  [place] to call system. Call bell, personal items and telephone within reach. Instruct patient to call for assistance. Room bathroom lighting operational. Non-slip footwear when patient is off stretcher. Physically safe environment: no spills, clutter or unnecessary equipment. Stretcher in lowest position, wheels locked, appropriate side rails in place.

## 2019-08-13 NOTE — ED PROVIDER NOTE - CLINICAL SUMMARY MEDICAL DECISION MAKING FREE TEXT BOX
ATTG: : abd pain: concern for but not limited to infection / obstruction / inflammation, check labs, check urine, check ct abd, pelvis, ivf, pain medication, re eval for dispo

## 2019-08-13 NOTE — H&P ADULT - NSHPLABSRESULTS_GEN_ALL_CORE
08-13    141  |  101  |  8   ----------------------------<  95  4.5   |  24  |  0.60    Ca    10.1      13 Aug 2019 12:53    TPro  7.4  /  Alb  4.5  /  TBili  0.5  /  DBili  x   /  AST  18  /  ALT  20  /  AlkPhos  59  08-13                          14.0   10.2  )-----------( 156      ( 13 Aug 2019 12:53 )             45.4       RADIOLOGY:  CT Abdomen and Pelvis w/ Oral Cont and w/ IV Cont (08.13.19 @ 15:26) >    FINDINGS:    LOWER CHEST: Bilateral breast implants.    LIVER: Within normal limits.  BILE DUCTS: Normal caliber.  GALLBLADDER: Within normal limits.  SPLEEN: Within normal limits.  PANCREAS: Within normal limits.  ADRENALS: Within normal limits.  KIDNEYS/URETERS: No hydronephrosis. A subcentimeter hypodense focus in   the left kidney is too small to characterize, but unchanged.    BLADDER: Within normal limits.  REPRODUCTIVE ORGANS: Uterus is within normal limits.  A 2 cm left adnexal   cyst is noted.    BOWEL: Gastric bypass surgery. No bowel obstruction. Sigmoid   diverticulosis with a short segment of mild bowel wall thickening and   adjacent inflammation, suspicious for mild sigmoid diverticulitis. No   evidence of an abscess. Appendix is normal.  PERITONEUM: Trace pelvic ascites.  VESSELS: Within normal limits.  RETROPERITONEUM/LYMPH NODES: No lymphadenopathy.    ABDOMINAL WALL: Within normal limits.  BONES: Mild degenerative changes.    IMPRESSION:   Mild uncomplicated sigmoid diverticulitis. Colonoscopy is recommended   following resolution of the acute symptomatology to exclude underlying   pathology.

## 2019-08-13 NOTE — H&P ADULT - NSICDXPASTSURGICALHX_GEN_ALL_CORE_FT
PAST SURGICAL HISTORY:  Arthropathy of shoulder region Rotator cuff Right - 1997, Left 2013    Colon abnormality Removal of scar tissue - 2009    S/P Abdominoplasty (ICD9 V45.89)     S/P Breast Augmentation (ICD9 V43.82)     S/P Gastric Bypass (ICD9 V45.86)

## 2019-08-13 NOTE — CONSULT NOTE ADULT - SUBJECTIVE AND OBJECTIVE BOX
Patient is a 48y old  Female who presents with a chief complaint of     HPI:    48 year old female with PMHx of obesity, gastric ulcer, s/p gastric bypass in 12/2003, s/p abdominoplasty,  SBO, infectious colitis, factor V leiden mutation, DVT on right leg in 2012, bipolar d/o, asthma, presents to ER with c/o severe LLQ abdominal pain without nausea or vomiting x2-3 days worsening with associated constipation and colicky discomfort; states hurts to lie on left side and has LLQ discomfort when passing flatus or BM +BM earlier today, took dose of Miralax yesterday (usual stools soft without any laxative); no rectal bleeding or melena. Sent to ER by primary GI (Zackary r/o diverticulitis/colitis)  +tobacco (active smoker)  Also has a history of prior jejuno-jejunal intususseption, and had diagnosed anastomotic ulcer 3/2019 Rx with PPI and Carafate (pt took for ~6 weeks)  no nausea, vomiting, fever or chills  No recent Abx, sick contacts or raw/undercooked foods  Recently received Morphine - feeling significant improvement in pain   Drinking oral contrast for CT scan    PAST MEDICAL & SURGICAL HISTORY:  Anemia due to blood loss  DVT (deep venous thrombosis), right: 2012 - Right knee  Factor 5 Leiden mutation, heterozygous  History of Obesity (ICD9 V13.8)  Bipolar Disorder, Unspecified (ICD9 296.80)  Asthma (ICD9 493.90)  Colon abnormality: Removal of scar tissue - 2009  Arthropathy of shoulder region: Rotator cuff Right - 1997, Left 2013  S/P Breast Augmentation (ICD9 V43.82)  S/P Abdominoplasty (ICD9 V45.89)  S/P Gastric Bypass (ICD9 V45.86)      Allergies  Naldecon-DX [facility]s (Unknown)        MEDICATIONS  (STANDING):    MEDICATIONS  (PRN):      Social History:    Marital Status:  (   )    ( X  ) Single    (   )    (  )   Lives with: (  ) alone  (  ) children   (  ) spouse   (  ) parents  ( X ) other - significant other    Substance Use (street drugs): (X  ) never used  (  ) other:  Tobacco Usage:  (   ) never smoked   (   ) former smoker   (X   ) current smoker ; last cigarette date (today)        Family History   IBD (  ) Yes   ( X ) No  GI Malignancy (  )  Yes    ( X ) No       Advanced Directives: (   X  ) None    (      ) DNR    (     ) DNI    (     ) Health Care Proxy:     Review of Systems:    General:  No fevers, chills, night sweats, fatigue,   CV:  No pain, palpitations, hypo/hypertension  Resp:  No dyspnea, cough, tachypnea, wheezing  GI:  see HPI  :  No pain, bleeding, incontinence, nocturia  Muscle:  No pain, weakness  Neuro:  No weakness, tingling, memory problems  Psych:  No fatigue, insomnia, mood problems, depression  Endocrine:  No polyuria, polydypsia, cold/heat intolerance  Heme:  No petechiae, ecchymosis, easy bruisability  Skin:  No rash, tattoos, scars, edema      Vital Signs Last 24 Hrs  T(C): 36.8 (13 Aug 2019 11:22), Max: 36.8 (13 Aug 2019 11:22)  T(F): 98.2 (13 Aug 2019 11:22), Max: 98.2 (13 Aug 2019 11:22)  HR: 84 (13 Aug 2019 11:22) (84 - 84)  BP: 121/91 (13 Aug 2019 11:22) (121/91 - 121/91)  BP(mean): --  RR: 18 (13 Aug 2019 11:22) (18 - 18)  SpO2: 98% (13 Aug 2019 11:22) (98% - 98%)    PHYSICAL EXAM:    Constitutional: well-developed non toxic appearing (recently received dose of IV Morphine) +odor of tobacco  Neck: No LAD, supple. no JVD  Respiratory: good air entry b/l, no wheeze  Cardiovascular: S1 and S2, RRR  Gastrointestinal: BS+, softly distended +LLQ tenderness without rebound or rigidity, WH surgical scars  Rectal: small edematous non bleeding non thrombosed hemorrhoid, normal rectal tone. not impacted. +firm brown stool in rectal vault, no palpable lesion, no BRB  Extremities: No peripheral edema, neg clubbing, cyanosis  Vascular: 2+ peripheral pulses  Neurological: A/O x 3, no focal deficits  Psychiatric: Normal mood, normal affect  Skin: No rashes, anicteric        LABS:                        14.0   10.2  )-----------( 156      ( 13 Aug 2019 12:53 )             45.4     08-13    141  |  101  |  8   ----------------------------<  95  4.5   |  24  |  0.60    Ca    10.1      13 Aug 2019 12:53    TPro  7.4  /  Alb  4.5  /  TBili  0.5  /  DBili  x   /  AST  18  /  ALT  20  /  AlkPhos  59  08-13        Blood Gas Profile - Venous (08.13.19 @ 12:53)    pH, Venous: 7.34    pCO2, Venous: 58 mmHg    pO2, Venous: <20 mmHg    HCO3, Venous: 30 mmol/L    Base Excess, Venous: 3.0 mmol/L    Oxygen Saturation, Venous: 24 %    Total CO2, Venous: 32 mmol/L      Lipase, Serum (08.13.19 @ 12:53)    Lipase, Serum: 25 U/L        RADIOLOGY & ADDITIONAL TESTS:

## 2019-08-13 NOTE — H&P ADULT - HISTORY OF PRESENT ILLNESS
This is a 48 year old female with past medical history of obesity, gastric ulcer, s/p gastric bypass in 12/2003, s/p abdominoplasty, SBO, infectious colitis, factor V leiden mutation, DVT on right leg in 2012, bipolar d/o, asthma, who now presents to the ED with c/o severe LLQ abdominal pain without nausea or vomiting x2-3 days worsening with associated constipation and colicky discomfort.  Pt states that the pain is exacerbated by laying on her left side and has LLQ discomfort when passing flatus or BM.  Pt had a BM earlier today; She took dose of Miralax yesterday with 1/2 of a xanax  (usual stools soft without any laxative); no rectal bleeding or melena. Pt called her gastroenterologist, Dr. Raymundo  who directed her to come to the ED to be evaluated for possible diverticulitis given her location of pain and symptoms.  Pt has a history of prior jejuno-jejunal intususseption, and had diagnosed anastomotic ulcer 3/2019 treated with PPI and Carafate (pt took for ~6 weeks). Pt denies nausea, vomiting, fever or chills, which she normally feels when she has intususseption.  She denies fevers, chills, recent Abx, sick contacts or raw/undercooked foods.  Pt received Morphine in the ED with significant improvement in pain.  bariatric Surgery Consulted for further evaluation.

## 2019-08-13 NOTE — ED PROVIDER NOTE - OBJECTIVE STATEMENT
48 female with h/o colitis, gastric bypass presents with c/o left side abdominal pain, rectal pain for 3 days. pt states no fever, chills, N/V/D, urinary symptoms 48 female with h/o colitis, gastric bypass presents with c/o left side abdominal pain, rectal pain for 3 days. pt states no fever, chills, N/V/D, urinary symptoms.

## 2019-08-13 NOTE — ED ADULT NURSE NOTE - OBJECTIVE STATEMENT
49 yo F aaox4 PMH Colitis, and gastric bypass arrived from triage Aaox4 Reporting abd pain X 1 wk. Pain becoming progressively worst. Pt reports pain similar  to Colitis flare up. Denies NVD. IV line placed labs drawn and sent. Medication given as ordered.

## 2019-08-13 NOTE — H&P ADULT - NSHPPHYSICALEXAM_GEN_ALL_CORE
General: NAD, Pleasant  Neurology: Patient is AA&Ox4, follows commands, and speech fluent. EOMI intact  Neck: Neck supple, trachea midline, No JVD  Respiratory: CTA B/L, (-)rales, rhonchi  CV: S1S2, r/r/r, (-)m/r/g  Abdomen: S/ND, BSx4, (+)LLQ tenderness  Extremities: 2+ peripheral pulses bilat throughout; (-)edema appreciated  Skin: No Rashes, Hematoma, Ecchymosis

## 2019-08-13 NOTE — ED PROVIDER NOTE - ATTENDING CONTRIBUTION TO CARE
49 y/o f with pmhx gastric bypass (2003), previous jejuno-jejunal intussusception SBO, marginal ulcer, factor V leiden mutation, right leg DVT in 2012, obesity, bipolar disorder presents with lower abd pain that began on Sun and worse today. no fever. still having BM but smaller amt and caliber today. no rectal bleeding. no fever. diminished appetite but tolerating po. Feels different than her prior episodes with intussusception. Pain better when lays on side. no urinary complaints.   Gen.  mild / moderate distress from pain. no resp distress.    HEENT:  perrl eomi  Lungs:  b/l bs  CVS: S1S2   Abd;  soft no guarding + ttp on left lower abd. no distention. no fluid shift.   Ext: no pitting edema or erythema  Neuro: aaox3 clear speech  MSK: strength 5/5 b/l upper and lower ext

## 2019-08-13 NOTE — ED ADULT NURSE NOTE - CHPI ED NUR SYMPTOMS NEG
no diarrhea/no fever/no nausea/no blood in stool/no dysuria/no hematuria/no abdominal distension/no burning urination/no chills/no vomiting

## 2019-08-13 NOTE — H&P ADULT - ATTENDING COMMENTS
I saw and examined the patient, and reviewed  the history and data with the patient and staff  Agree with note which was also reviewed and edited where appropriate.  D/W patient, RN, residents and Fellow    pt tender with "uncomplicated" diverticulitis, responding to NPO and IV abx.  Will allow liquids, and decide disposition today pending above.

## 2019-08-13 NOTE — H&P ADULT - NSICDXPASTMEDICALHX_GEN_ALL_CORE_FT
PAST MEDICAL HISTORY:  Anemia due to blood loss     Asthma (ICD9 493.90)     Bipolar Disorder, Unspecified (ICD9 296.80)     DVT (deep venous thrombosis), right 2012 - Right knee    Factor 5 Leiden mutation, heterozygous     History of Obesity (ICD9 V13.8)

## 2019-08-13 NOTE — H&P ADULT - ASSESSMENT
This is a 48 year old female with past medical history of obesity, gastric ulcer, s/p gastric bypass in 12/2003, s/p abdominoplasty, SBO, infectious colitis, factor V leiden mutation, DVT on right leg in 2012, bipolar d/o, asthma, who now presents to the ED with c/o severe LLQ abdominal pain without nausea or vomiting x2-3 days worsening with associated constipation and colicky discomfort and imaging revealing uncomplicated sigmoid diverticulitis.    -Admit to [facility], Dr. English  -NPO except sips and chips  -banana bag x1 followed by maintenance IVF  -Continue home meds  -IV antibiotics  -Serial abdominal exam  -Daily labs  -D/w [facility] Fellow, Dr. Hickey

## 2019-08-14 LAB
ALBUMIN SERPL ELPH-MCNC: 3.9 G/DL — SIGNIFICANT CHANGE UP (ref 3.3–5)
ALP SERPL-CCNC: 53 U/L — SIGNIFICANT CHANGE UP (ref 40–120)
ALT FLD-CCNC: 17 U/L — SIGNIFICANT CHANGE UP (ref 10–45)
ANION GAP SERPL CALC-SCNC: 11 MMOL/L — SIGNIFICANT CHANGE UP (ref 5–17)
AST SERPL-CCNC: 18 U/L — SIGNIFICANT CHANGE UP (ref 10–40)
BASOPHILS # BLD AUTO: 0.04 K/UL — SIGNIFICANT CHANGE UP (ref 0–0.2)
BASOPHILS NFR BLD AUTO: 0.6 % — SIGNIFICANT CHANGE UP (ref 0–2)
BILIRUB SERPL-MCNC: 0.6 MG/DL — SIGNIFICANT CHANGE UP (ref 0.2–1.2)
BUN SERPL-MCNC: 6 MG/DL — LOW (ref 7–23)
CALCIUM SERPL-MCNC: 9.2 MG/DL — SIGNIFICANT CHANGE UP (ref 8.4–10.5)
CHLORIDE SERPL-SCNC: 104 MMOL/L — SIGNIFICANT CHANGE UP (ref 96–108)
CO2 SERPL-SCNC: 26 MMOL/L — SIGNIFICANT CHANGE UP (ref 22–31)
CREAT SERPL-MCNC: 0.57 MG/DL — SIGNIFICANT CHANGE UP (ref 0.5–1.3)
EOSINOPHIL # BLD AUTO: 0.08 K/UL — SIGNIFICANT CHANGE UP (ref 0–0.5)
EOSINOPHIL NFR BLD AUTO: 1.2 % — SIGNIFICANT CHANGE UP (ref 0–6)
GLUCOSE SERPL-MCNC: 80 MG/DL — SIGNIFICANT CHANGE UP (ref 70–99)
HCT VFR BLD CALC: 40.3 % — SIGNIFICANT CHANGE UP (ref 34.5–45)
HGB BLD-MCNC: 12.7 G/DL — SIGNIFICANT CHANGE UP (ref 11.5–15.5)
IMM GRANULOCYTES NFR BLD AUTO: 0.3 % — SIGNIFICANT CHANGE UP (ref 0–1.5)
LYMPHOCYTES # BLD AUTO: 1.76 K/UL — SIGNIFICANT CHANGE UP (ref 1–3.3)
LYMPHOCYTES # BLD AUTO: 27.2 % — SIGNIFICANT CHANGE UP (ref 13–44)
MCHC RBC-ENTMCNC: 28.6 PG — SIGNIFICANT CHANGE UP (ref 27–34)
MCHC RBC-ENTMCNC: 31.5 GM/DL — LOW (ref 32–36)
MCV RBC AUTO: 90.8 FL — SIGNIFICANT CHANGE UP (ref 80–100)
MONOCYTES # BLD AUTO: 0.88 K/UL — SIGNIFICANT CHANGE UP (ref 0–0.9)
MONOCYTES NFR BLD AUTO: 13.6 % — SIGNIFICANT CHANGE UP (ref 2–14)
NEUTROPHILS # BLD AUTO: 3.7 K/UL — SIGNIFICANT CHANGE UP (ref 1.8–7.4)
NEUTROPHILS NFR BLD AUTO: 57.1 % — SIGNIFICANT CHANGE UP (ref 43–77)
PLATELET # BLD AUTO: 141 K/UL — LOW (ref 150–400)
POTASSIUM SERPL-MCNC: 4.3 MMOL/L — SIGNIFICANT CHANGE UP (ref 3.5–5.3)
POTASSIUM SERPL-SCNC: 4.3 MMOL/L — SIGNIFICANT CHANGE UP (ref 3.5–5.3)
PROT SERPL-MCNC: 6.5 G/DL — SIGNIFICANT CHANGE UP (ref 6–8.3)
RBC # BLD: 4.44 M/UL — SIGNIFICANT CHANGE UP (ref 3.8–5.2)
RBC # FLD: 13.8 % — SIGNIFICANT CHANGE UP (ref 10.3–14.5)
SODIUM SERPL-SCNC: 141 MMOL/L — SIGNIFICANT CHANGE UP (ref 135–145)
WBC # BLD: 6.48 K/UL — SIGNIFICANT CHANGE UP (ref 3.8–10.5)
WBC # FLD AUTO: 6.48 K/UL — SIGNIFICANT CHANGE UP (ref 3.8–10.5)

## 2019-08-14 PROCEDURE — 99233 SBSQ HOSP IP/OBS HIGH 50: CPT

## 2019-08-14 RX ORDER — THIAMINE MONONITRATE (VIT B1) 100 MG
100 TABLET ORAL DAILY
Refills: 0 | Status: DISCONTINUED | OUTPATIENT
Start: 2019-08-14 | End: 2019-08-15

## 2019-08-14 RX ORDER — ACETAMINOPHEN 500 MG
1000 TABLET ORAL ONCE
Refills: 0 | Status: COMPLETED | OUTPATIENT
Start: 2019-08-14 | End: 2019-08-14

## 2019-08-14 RX ORDER — MORPHINE SULFATE 50 MG/1
2 CAPSULE, EXTENDED RELEASE ORAL ONCE
Refills: 0 | Status: DISCONTINUED | OUTPATIENT
Start: 2019-08-14 | End: 2019-08-14

## 2019-08-14 RX ORDER — MONTELUKAST 4 MG/1
10 TABLET, CHEWABLE ORAL AT BEDTIME
Refills: 0 | Status: DISCONTINUED | OUTPATIENT
Start: 2019-08-14 | End: 2019-08-15

## 2019-08-14 RX ADMIN — SIMETHICONE 80 MILLIGRAM(S): 80 TABLET, CHEWABLE ORAL at 01:30

## 2019-08-14 RX ADMIN — Medication 200 MILLIGRAM(S): at 06:24

## 2019-08-14 RX ADMIN — ENOXAPARIN SODIUM 40 MILLIGRAM(S): 100 INJECTION SUBCUTANEOUS at 21:43

## 2019-08-14 RX ADMIN — Medication 200 MILLIGRAM(S): at 17:21

## 2019-08-14 RX ADMIN — MORPHINE SULFATE 2 MILLIGRAM(S): 50 CAPSULE, EXTENDED RELEASE ORAL at 02:27

## 2019-08-14 RX ADMIN — SIMETHICONE 80 MILLIGRAM(S): 80 TABLET, CHEWABLE ORAL at 06:38

## 2019-08-14 RX ADMIN — Medication 400 MILLIGRAM(S): at 08:23

## 2019-08-14 RX ADMIN — SIMETHICONE 80 MILLIGRAM(S): 80 TABLET, CHEWABLE ORAL at 21:44

## 2019-08-14 RX ADMIN — Medication 100 MILLIGRAM(S): at 06:24

## 2019-08-14 RX ADMIN — MORPHINE SULFATE 2 MILLIGRAM(S): 50 CAPSULE, EXTENDED RELEASE ORAL at 02:57

## 2019-08-14 RX ADMIN — SODIUM CHLORIDE 125 MILLILITER(S): 9 INJECTION, SOLUTION INTRAVENOUS at 06:41

## 2019-08-14 RX ADMIN — MORPHINE SULFATE 2 MILLIGRAM(S): 50 CAPSULE, EXTENDED RELEASE ORAL at 12:00

## 2019-08-14 RX ADMIN — Medication 100 MILLIGRAM(S): at 21:44

## 2019-08-14 RX ADMIN — Medication 1 TABLET(S): at 14:12

## 2019-08-14 RX ADMIN — Medication 100 MILLIGRAM(S): at 14:12

## 2019-08-14 RX ADMIN — Medication 1000 MILLIGRAM(S): at 08:50

## 2019-08-14 RX ADMIN — MORPHINE SULFATE 2 MILLIGRAM(S): 50 CAPSULE, EXTENDED RELEASE ORAL at 11:43

## 2019-08-14 NOTE — CHART NOTE - NSCHARTNOTEFT_GEN_A_CORE
Gastroenterology    Feeling better overall  Tolerating PO clears  +BM - firm  no nausea or vomiting    Vital Signs Last 24 Hrs  T(C): 36.7 (14 Aug 2019 12:31), Max: 37.1 (13 Aug 2019 15:00)  T(F): 98.1 (14 Aug 2019 12:31), Max: 98.8 (13 Aug 2019 15:00)  HR: 52 (14 Aug 2019 12:31) (50 - 67)  BP: 107/57 (14 Aug 2019 12:31) (95/64 - 118/80)  BP(mean): --  RR: 18 (14 Aug 2019 12:31) (18 - 18)  SpO2: 100% (14 Aug 2019 12:31) (95% - 100%)    Ambulating in room, NAD, non toxic. wearing belted jeans  Soft ND mild tenderness LLQ to deep palpation, no rebound guarding or rigiduty    Ac Sigmoid Diverticulitis - improving on Cipro/FLagyl  -Consider possible advance to LRD (phase 3 bariatric); discussed with Surgical PA  -OOB, ambulate  -eventual outpt colonoscopy in 4-6 weeks    Keith Nielsen PA-C    [facility]  [phone]  After hours and weekend coverage [phone]

## 2019-08-14 NOTE — PROGRESS NOTE ADULT - ATTENDING COMMENTS
I saw and examined the patient, and reviewed  the history and data with the patient and staff  Agree with note which was also reviewed and edited where appropriate.  D/W patient, RN, residents and Fellow    Better today.  , will allow liquids.    Home on abx when tolerates po.

## 2019-08-14 NOTE — PROVIDER CONTACT NOTE (OTHER) - SITUATION
pt stated she was told she would be changed to a bariatric phase 3 diet. pt stated she would leave the floor to get her own food

## 2019-08-14 NOTE — PROGRESS NOTE ADULT - SUBJECTIVE AND OBJECTIVE BOX
[facility] Progress Note     SUBJECTIVE / 24H EVENTS  Patient seen and examined on morning rounds. She was admitted overnight for noncomplicated diverticulitis seen on CT scan. She is mostly having LLQ pain but no nausea or vomiting.     OBJECTIVE:    VITAL SIGNS:  T(C): 36.6 (19 @ 01:05), Max: 37.1 (19 @ 15:00)  HR: 50 (19 @ 01:05) (50 - 84)  BP: 105/61 (19 @ 01:05) (95/64 - 121/91)  RR: 18 (19 @ 01:05) (18 - 18)  SpO2: 96% (19 @ 01:05) (95% - 100%)      PHYSICAL EXAM:  Gen: NAD  LS: Respirations unlabored  Card: Bradycardic with regular rhythm    GI: Soft. tender to palpation in LLQ. No peritoneal signs. Nondistended.   Ext: Warm, well perfused      19 @ 07:01  -  19 @ 01:31  --------------------------------------------------------  IN:  Total IN: 0 mL    OUT:    Voided: 400 mL  Total OUT: 400 mL    Total NET: -400 mL          LAB VALUES:      141  |  101  |  8   ----------------------------<  95  4.5   |  24  |  0.60    Ca    10.1      13 Aug 2019 12:53    TPro  7.4  /  Alb  4.5  /  TBili  0.5  /  DBili  x   /  AST  18  /  ALT  20  /  AlkPhos  59                                 14.0   10.2  )-----------( 156      ( 13 Aug 2019 12:53 )             45.4     LIVER FUNCTIONS - ( 13 Aug 2019 12:53 )  Alb: 4.5 g/dL / Pro: 7.4 g/dL / ALK PHOS: 59 U/L / ALT: 20 U/L / AST: 18 U/L / GGT: x                   Urinalysis Basic - ( 13 Aug 2019 13:08 )    Color: Yellow / Appearance: Clear / S.008 / pH: x  Gluc: x / Ketone: Negative  / Bili: Negative / Urobili: Negative   Blood: x / Protein: Negative / Nitrite: Negative   Leuk Esterase: Negative / RBC: x / WBC x   Sq Epi: x / Non Sq Epi: x / Bacteria: x        MICROBIOLOGY:    No new microbiology data for review.     RADIOLOGY:    CT Abdomen and Pelvis w/ Oral Cont and w/ IV Cont (19 @ 15:26)   IMPRESSION:     Mild uncomplicated sigmoid diverticulitis. Colonoscopy is recommended   following resolution of the acute symptomatology to exclude underlying   pathology.        MEDICATIONS  (STANDING):  ciprofloxacin   IVPB 400 milliGRAM(s) IV Intermittent every 12 hours  dextrose 5% + sodium chloride 0.45%. 1000 milliLiter(s) (125 mL/Hr) IV Continuous <Continuous>  enoxaparin Injectable 40 milliGRAM(s) SubCutaneous daily  metroNIDAZOLE  IVPB 500 milliGRAM(s) IV Intermittent every 8 hours  pantoprazole    Tablet 40 milliGRAM(s) Oral before breakfast  sucralfate suspension 1 Gram(s) Oral every 6 hours    MEDICATIONS  (PRN):  acetaminophen   Tablet .. 650 milliGRAM(s) Oral every 6 hours PRN Mild Pain (1 - 3), Moderate Pain (4 - 6), Severe Pain (7 - 10)  simethicone 80 milliGRAM(s) Chew three times a day PRN Gas

## 2019-08-14 NOTE — PROGRESS NOTE ADULT - ASSESSMENT
48 year old female with past medical history of obesity, gastric ulcer, s/p gastric bypass in 12/2003, s/p abdominoplasty, SBO, infectious colitis, factor V leiden mutation, DVT on right leg in 2012, bipolar d/o, asthma, who now presents to the ED with c/o severe LLQ abdominal pain without nausea or vomiting x2-3 days worsening with associated constipation and colicky discomfort and imaging revealing uncomplicated sigmoid diverticulitis.      Plan:    -NPO except sips and chips  -banana bag x1 followed by maintenance IVF  -Continue home meds  -IV antibiotics  -Serial abdominal exam  -F/u AM labs      9003

## 2019-08-15 ENCOUNTER — TRANSCRIPTION ENCOUNTER (OUTPATIENT)
Age: 49
End: 2019-08-15

## 2019-08-15 VITALS
DIASTOLIC BLOOD PRESSURE: 83 MMHG | HEART RATE: 67 BPM | RESPIRATION RATE: 18 BRPM | TEMPERATURE: 98 F | OXYGEN SATURATION: 99 % | SYSTOLIC BLOOD PRESSURE: 112 MMHG

## 2019-08-15 LAB
ANION GAP SERPL CALC-SCNC: 10 MMOL/L — SIGNIFICANT CHANGE UP (ref 5–17)
BUN SERPL-MCNC: 5 MG/DL — LOW (ref 7–23)
CALCIUM SERPL-MCNC: 9.6 MG/DL — SIGNIFICANT CHANGE UP (ref 8.4–10.5)
CHLORIDE SERPL-SCNC: 101 MMOL/L — SIGNIFICANT CHANGE UP (ref 96–108)
CO2 SERPL-SCNC: 28 MMOL/L — SIGNIFICANT CHANGE UP (ref 22–31)
CREAT SERPL-MCNC: 0.6 MG/DL — SIGNIFICANT CHANGE UP (ref 0.5–1.3)
GLUCOSE SERPL-MCNC: 94 MG/DL — SIGNIFICANT CHANGE UP (ref 70–99)
HCT VFR BLD CALC: 41.4 % — SIGNIFICANT CHANGE UP (ref 34.5–45)
HGB BLD-MCNC: 13.1 G/DL — SIGNIFICANT CHANGE UP (ref 11.5–15.5)
MAGNESIUM SERPL-MCNC: 1.9 MG/DL — SIGNIFICANT CHANGE UP (ref 1.6–2.6)
MCHC RBC-ENTMCNC: 28.1 PG — SIGNIFICANT CHANGE UP (ref 27–34)
MCHC RBC-ENTMCNC: 31.6 GM/DL — LOW (ref 32–36)
MCV RBC AUTO: 88.8 FL — SIGNIFICANT CHANGE UP (ref 80–100)
PHOSPHATE SERPL-MCNC: 4 MG/DL — SIGNIFICANT CHANGE UP (ref 2.5–4.5)
PLATELET # BLD AUTO: 156 K/UL — SIGNIFICANT CHANGE UP (ref 150–400)
POTASSIUM SERPL-MCNC: 4.4 MMOL/L — SIGNIFICANT CHANGE UP (ref 3.5–5.3)
POTASSIUM SERPL-SCNC: 4.4 MMOL/L — SIGNIFICANT CHANGE UP (ref 3.5–5.3)
RBC # BLD: 4.66 M/UL — SIGNIFICANT CHANGE UP (ref 3.8–5.2)
RBC # FLD: 13.4 % — SIGNIFICANT CHANGE UP (ref 10.3–14.5)
SODIUM SERPL-SCNC: 139 MMOL/L — SIGNIFICANT CHANGE UP (ref 135–145)
WBC # BLD: 8.07 K/UL — SIGNIFICANT CHANGE UP (ref 3.8–10.5)
WBC # FLD AUTO: 8.07 K/UL — SIGNIFICANT CHANGE UP (ref 3.8–10.5)

## 2019-08-15 RX ORDER — DOCUSATE SODIUM 100 MG
100 CAPSULE ORAL
Refills: 0 | Status: DISCONTINUED | OUTPATIENT
Start: 2019-08-15 | End: 2019-08-15

## 2019-08-15 RX ORDER — METRONIDAZOLE 500 MG
1 TABLET ORAL
Qty: 20 | Refills: 0
Start: 2019-08-15 | End: 2019-08-24

## 2019-08-15 RX ORDER — OXYCODONE HYDROCHLORIDE 5 MG/1
1 TABLET ORAL
Qty: 10 | Refills: 0
Start: 2019-08-15

## 2019-08-15 RX ORDER — CIPROFLOXACIN LACTATE 400MG/40ML
5 VIAL (ML) INTRAVENOUS
Qty: 1 | Refills: 0
Start: 2019-08-15 | End: 2019-08-24

## 2019-08-15 RX ADMIN — MONTELUKAST 10 MILLIGRAM(S): 4 TABLET, CHEWABLE ORAL at 00:05

## 2019-08-15 RX ADMIN — Medication 100 MILLIGRAM(S): at 10:05

## 2019-08-15 RX ADMIN — Medication 1 GRAM(S): at 00:05

## 2019-08-15 RX ADMIN — Medication 100 MILLIGRAM(S): at 06:18

## 2019-08-15 RX ADMIN — Medication 200 MILLIGRAM(S): at 06:19

## 2019-08-15 NOTE — DISCHARGE NOTE PROVIDER - HOSPITAL COURSE
On 8/13, 48 year old female with past medical history of obesity, gastric ulcer, s/p gastric bypass in 12/2003, s/p abdominoplasty, SBO, infectious colitis, factor V leiden mutation, DVT on right leg in 2012, bipolar d/o, asthma, presented to the ED with c/o severe LLQ abdominal pain without nausea or vomiting x2-3 days worsening with associated constipation and colicky discomfort and imaging revealing uncomplicated sigmoid diverticulitis.        8/14: Patient having painful bowel movements secondary levator ani spasms as well, remains afebrile and without leukocytosis.        8/15: Patient ambulating without pain and tolerating a phase 3 Bariatric low fiber diet        At the time of discharge, the patient was hemodynamically stable, was tolerating PO diet, was voiding urine and passing stool, was ambulating, and was comfortable with adequate pain control.

## 2019-08-15 NOTE — DISCHARGE NOTE NURSING/CASE MANAGEMENT/SOCIAL WORK - NSDCDPATPORTLINK_GEN_ALL_CORE
You can access the [employer] [facility] Patient Portal, offered by [facility], by registering with the following website: [email or web address]

## 2019-08-15 NOTE — PROGRESS NOTE ADULT - ASSESSMENT
48 year old female with past medical history of obesity, gastric ulcer, s/p gastric bypass in 12/2003, s/p abdominoplasty, SBO, p/w severe LLQ abdominal pain without nausea or vomiting x2-3 days worsening with associated constipation and colicky discomfort and imaging revealing uncomplicated sigmoid diverticulitis.      Plan:    -CLD  -On maintenance fluids  -Continue home meds  -IV antibiotics  -Serial abdominal exam  -F/u AM labs      9003

## 2019-08-15 NOTE — PROGRESS NOTE ADULT - SUBJECTIVE AND OBJECTIVE BOX
Interval Events: Advanced to CLD    S: Patient reports pain with BM. Ambulating halls without pain and denies fevers, chills, nausea, emesis, chest pain, SOB.    O: Vital Signs  T(C): 36.7 (08-15 @ 01:17), Max: 37.1 (08-14 @ 16:53)  HR: 67 (08-15 @ 01:17) (51 - 91)  BP: 109/67 (08-15 @ 01:17) (102/65 - 125/63)  RR: 18 (08-15 @ 01:17) (18 - 18)  SpO2: 98% (08-15 @ 01:17) (98% - 100%)  08-13-19 @ 07:01  -  08-14-19 @ 07:00  --------------------------------------------------------  IN: 1500 mL / OUT: 1000 mL / NET: 500 mL    08-14-19 @ 07:01  -  08-15-19 @ 04:18  --------------------------------------------------------  IN: 2040 mL / OUT: 1500 mL / NET: 540 mL      General: alert and oriented, NAD  Resp: airway patent, respirations unlabored  CVS: regular rate and rhythm  Abdomen: soft, tender in LLQ, nondistended  Extremities: no edema  Skin: warm, dry, appropriate color                          12.7   6.48  )-----------( 141      ( 14 Aug 2019 08:50 )             40.3   08-14    141  |  104  |  6<L>  ----------------------------<  80  4.3   |  26  |  0.57    Ca    9.2      14 Aug 2019 07:12    TPro  6.5  /  Alb  3.9  /  TBili  0.6  /  DBili  x   /  AST  18  /  ALT  17  /  AlkPhos  53  08-14

## 2019-08-15 NOTE — DISCHARGE NOTE PROVIDER - CARE PROVIDERS DIRECT ADDRESSES
[email or web address]

## 2019-08-15 NOTE — PROGRESS NOTE ADULT - ATTENDING COMMENTS
I saw and examined the patient, and reviewed  the history and data with the patient and staff  Agree with note which was also reviewed and edited where appropriate.  D/W patient, RN, residents and Fellow    Doing well from surgery POV, no indication for OR ATT.  Disposition, diet and abx per GI service.

## 2019-08-15 NOTE — DISCHARGE NOTE PROVIDER - NSDCFUADDINST_GEN_ALL_CORE_FT
PAIN CONTROL: You may take 650 mg of Tylenol every 4-6 hours. Do not exceed 4 grams of Tylenol daily. Take oxycodone as needed for severe pain, one tab every 4-6 hours. Do not exceed 6 tabs daily.    ACTIVITY:  If you are taking narcotic pain medication (such as oxycodone), do NOT drive a car, operate machinery or make important decisions.    DIET: Return to your usual diet.    FOLLOW-UP:  1. Please follow up with Dr. Downs [phone] in one week regarding your hospitalization.     2. Please follow-up with Dr. Raymundo [phone] in one week regarding your hospitalization     3. Please follow up with your primary care physician in one week regarding your hospitalization.

## 2019-08-15 NOTE — DISCHARGE NOTE PROVIDER - CARE PROVIDER_API CALL
Azeem Downs)  ColonRectal Surgery; Surgery  [address], [address]  [place] [address]  Phone: [phone]  Fax: [phone]  Follow Up Time:     Kendrick Raymundo)  Gastroenterology  [address], [address]  [place], [place] [id number]  Phone: [phone]  Fax: [phone]  Follow Up Time:

## 2019-08-16 ENCOUNTER — TRANSCRIPTION ENCOUNTER (OUTPATIENT)
Age: 49
End: 2019-08-16

## 2019-08-22 ENCOUNTER — APPOINTMENT (OUTPATIENT)
Dept: RADIOLOGY | Facility: IMAGING CENTER | Age: 49
End: 2019-08-22
Payer: MEDICAID

## 2019-08-22 ENCOUNTER — APPOINTMENT (OUTPATIENT)
Dept: MAMMOGRAPHY | Facility: IMAGING CENTER | Age: 49
End: 2019-08-22
Payer: MEDICAID

## 2019-08-22 ENCOUNTER — OUTPATIENT (OUTPATIENT)
Dept: OUTPATIENT SERVICES | Facility: HOSPITAL | Age: 49
LOS: 1 days | End: 2019-08-22
Payer: MEDICAID

## 2019-08-22 DIAGNOSIS — Z00.8 ENCOUNTER FOR OTHER GENERAL EXAMINATION: ICD-10-CM

## 2019-08-22 DIAGNOSIS — K63.9 DISEASE OF INTESTINE, UNSPECIFIED: Chronic | ICD-10-CM

## 2019-08-22 DIAGNOSIS — M12.9 ARTHROPATHY, UNSPECIFIED: Chronic | ICD-10-CM

## 2019-08-22 PROCEDURE — 77080 DXA BONE DENSITY AXIAL: CPT | Mod: 26

## 2019-08-22 PROCEDURE — 77067 SCR MAMMO BI INCL CAD: CPT | Mod: 26

## 2019-08-22 PROCEDURE — 77063 BREAST TOMOSYNTHESIS BI: CPT | Mod: 26

## 2019-08-22 PROCEDURE — 77080 DXA BONE DENSITY AXIAL: CPT

## 2019-08-22 PROCEDURE — 77063 BREAST TOMOSYNTHESIS BI: CPT

## 2019-08-22 PROCEDURE — 77067 SCR MAMMO BI INCL CAD: CPT

## 2019-08-29 PROBLEM — K62.5 RECTAL BLEEDING: Status: ACTIVE | Noted: 2019-08-29

## 2019-08-30 ENCOUNTER — APPOINTMENT (OUTPATIENT)
Dept: SURGERY | Facility: CLINIC | Age: 49
End: 2019-08-30
Payer: MEDICAID

## 2019-08-30 VITALS
TEMPERATURE: 97.9 F | HEIGHT: 67 IN | WEIGHT: 150 LBS | BODY MASS INDEX: 23.54 KG/M2 | SYSTOLIC BLOOD PRESSURE: 119 MMHG | RESPIRATION RATE: 16 BRPM | DIASTOLIC BLOOD PRESSURE: 81 MMHG | OXYGEN SATURATION: 100 % | HEART RATE: 65 BPM

## 2019-08-30 DIAGNOSIS — M62.838 OTHER MUSCLE SPASM: ICD-10-CM

## 2019-08-30 DIAGNOSIS — K62.5 HEMORRHAGE OF ANUS AND RECTUM: ICD-10-CM

## 2019-08-30 PROCEDURE — 99214 OFFICE O/P EST MOD 30 MIN: CPT | Mod: 25

## 2019-08-30 PROCEDURE — 46600 DIAGNOSTIC ANOSCOPY SPX: CPT

## 2019-08-30 RX ORDER — MONTELUKAST SODIUM 10 MG/1
10 TABLET, FILM COATED ORAL
Refills: 0 | Status: ACTIVE | COMMUNITY

## 2019-08-30 NOTE — HISTORY OF PRESENT ILLNESS
[de-identified] : On 8/13, 48 year old female with past medical history of obesity, gastric ulcer, s/p gastric bypass in 12/2003, s/p abdominoplasty, SBO, infectious colitis, factor V leiden mutation, DVT on right leg in 2012, bipolar d/o, asthma, presented to [facility] with c/o severe LLQ abdominal pain without nausea or vomiting x2-3 days worsening with associated constipation and colicky discomfort and imaging revealing uncomplicated sigmoid diverticulitis. The patient was last seen in the office in 7/16 with left-sided levator spasm. She was referred to pelvic floor physical therapy but did not follow upfor further treatment.In the office today her main complaint is severe rectal discomfort. She denies rectal bleeding or prolapse of tissue. She has completed her course of Cipro and Flagyl for diverticulitis.She has not had a recent colonoscopy.

## 2019-08-30 NOTE — ASSESSMENT
[FreeTextEntry1] : In summary the patient has recovered from an episode of sigmoid diverticulitis. I recommended that she undergo a colonoscopy in 6-8 weeks for followup. She also has mild left-sided levator spasm. She cannot take nonsteroidal anti-inflammatory secondary to her bariatric surgery. I recommended warm tub baths, stool softeners, and pelvic floor physical therapy if her symptoms worsen or persist. Finally she has minimally symptomatic internal hemorrhoids. He should be left alone unless they become more symptomatic.

## 2019-08-30 NOTE — PHYSICAL EXAM
[Normal Rate and Rhythm] : normal rate and rhythm [Abdominal Masses] : No abdominal masses [Abdomen Tenderness] : ~T ~M No abdominal tenderness [No HSM] : no hepatosplenomegaly [No Rash or Lesion] : No rash or lesion [Alert] : alert [Calm] : calm [de-identified] : nad [de-identified] : Perianal inspection and digital rectal examination and anoscopy revealed no thrombosed external hemorrhoids. There is no fissure fistula or abscess. Anal rectal tone is normal. There is left-sided levator spasm as well as mild coccygeal tenderness. There is no right-sided levator spasm. Anoscopy reveals enlarged circumferential internal hemorrhoids with otherwise normal distal rectal mucosa. [de-identified] : nl

## 2019-10-31 PROCEDURE — 74177 CT ABD & PELVIS W/CONTRAST: CPT

## 2019-10-31 PROCEDURE — 82803 BLOOD GASES ANY COMBINATION: CPT

## 2019-10-31 PROCEDURE — G0378: CPT

## 2019-10-31 PROCEDURE — 83735 ASSAY OF MAGNESIUM: CPT

## 2019-10-31 PROCEDURE — 83690 ASSAY OF LIPASE: CPT

## 2019-10-31 PROCEDURE — 96375 TX/PRO/DX INJ NEW DRUG ADDON: CPT | Mod: XU

## 2019-10-31 PROCEDURE — 96374 THER/PROPH/DIAG INJ IV PUSH: CPT | Mod: XU

## 2019-10-31 PROCEDURE — 84100 ASSAY OF PHOSPHORUS: CPT

## 2019-10-31 PROCEDURE — 85027 COMPLETE CBC AUTOMATED: CPT

## 2019-10-31 PROCEDURE — 80048 BASIC METABOLIC PNL TOTAL CA: CPT

## 2019-10-31 PROCEDURE — 80053 COMPREHEN METABOLIC PANEL: CPT

## 2019-10-31 PROCEDURE — 81001 URINALYSIS AUTO W/SCOPE: CPT

## 2019-10-31 PROCEDURE — 99285 EMERGENCY DEPT VISIT HI MDM: CPT | Mod: 25

## 2019-11-04 ENCOUNTER — APPOINTMENT (OUTPATIENT)
Dept: SURGERY | Facility: HOSPITAL | Age: 49
End: 2019-11-04

## 2019-11-19 ENCOUNTER — APPOINTMENT (OUTPATIENT)
Dept: SURGERY | Facility: CLINIC | Age: 49
End: 2019-11-19

## 2020-01-08 ENCOUNTER — CHART COPY (OUTPATIENT)
Age: 50
End: 2020-01-08

## 2020-02-13 NOTE — DISCHARGE NOTE NURSING/CASE MANAGEMENT/SOCIAL WORK - NURSING SECTION COMPLETE
Patient/Caregiver provided printed discharge information. PAST SURGICAL HISTORY:  AICD (automatic cardioverter/defibrillator) present

## 2020-03-20 NOTE — PATIENT PROFILE ADULT - DOES PATIENT HAVE ADVANCE DIRECTIVE
"-- DO NOT REPLY / DO NOT REPLY ALL --  -- Message is from the [employer]--    General Patient Message      Reason for Call: Patient missed a call from the doctor. Patient was prescribed an antibiotic and a cough suppressant for night. Patient stated that she still has a lingering cough and would like to know if she can take a cough suppressant for the day. Patient has purchased Delsym (recommended by pharmacist) for the morning and she stated that the cough has gotten better for the morning. Please contact the patient. If unavailable please leave a detailed message. Caller Information       Type Contact Phone    03/19/2020 05:50 PM Phone (Incoming) Skyla Wallace (Self) [phone] (M)          Alternative phone number: [phone]    Turnaround time given to caller: ""This message will be sent to [facility] Provider's name]. The clinical team will fulfill your request as soon as they review your message. \""}    " no

## 2021-03-09 ENCOUNTER — APPOINTMENT (OUTPATIENT)
Dept: MAMMOGRAPHY | Facility: IMAGING CENTER | Age: 51
End: 2021-03-09

## 2021-04-12 ENCOUNTER — EMERGENCY (EMERGENCY)
Facility: HOSPITAL | Age: 51
LOS: 1 days | Discharge: HOME | End: 2021-04-12
Attending: EMERGENCY MEDICINE
Payer: MEDICAID

## 2021-04-12 VITALS
TEMPERATURE: 98 F | OXYGEN SATURATION: 99 % | SYSTOLIC BLOOD PRESSURE: 129 MMHG | RESPIRATION RATE: 18 BRPM | HEIGHT: 67 IN | WEIGHT: 156.09 LBS | HEART RATE: 67 BPM | DIASTOLIC BLOOD PRESSURE: 88 MMHG

## 2021-04-12 DIAGNOSIS — M12.9 ARTHROPATHY, UNSPECIFIED: Chronic | ICD-10-CM

## 2021-04-12 DIAGNOSIS — K63.9 DISEASE OF INTESTINE, UNSPECIFIED: Chronic | ICD-10-CM

## 2021-04-12 PROCEDURE — 99283 EMERGENCY DEPT VISIT LOW MDM: CPT

## 2021-04-12 PROCEDURE — 99284 EMERGENCY DEPT VISIT MOD MDM: CPT

## 2021-04-12 RX ORDER — DIAZEPAM 5 MG
1 TABLET ORAL
Qty: 9 | Refills: 0
Start: 2021-04-12 | End: 2021-04-14

## 2021-04-12 RX ORDER — LIDOCAINE 4 G/100G
1 CREAM TOPICAL ONCE
Refills: 0 | Status: COMPLETED | OUTPATIENT
Start: 2021-04-12 | End: 2021-04-12

## 2021-04-12 RX ORDER — ACETAMINOPHEN 500 MG
975 TABLET ORAL ONCE
Refills: 0 | Status: COMPLETED | OUTPATIENT
Start: 2021-04-12 | End: 2021-04-12

## 2021-04-12 RX ORDER — DIAZEPAM 5 MG
5 TABLET ORAL ONCE
Refills: 0 | Status: DISCONTINUED | OUTPATIENT
Start: 2021-04-12 | End: 2021-04-12

## 2021-04-12 RX ADMIN — Medication 5 MILLIGRAM(S): at 15:40

## 2021-04-12 RX ADMIN — LIDOCAINE 1 PATCH: 4 CREAM TOPICAL at 15:40

## 2021-04-12 RX ADMIN — Medication 975 MILLIGRAM(S): at 15:39

## 2021-04-12 NOTE — ED PROVIDER NOTE - OBJECTIVE STATEMENT
50 y.o. female Hx gastric bypass, intusseption, coming in with right sided lower back pain over the past 2 weeks.  Pain is more on the right, worse with movement.  Denies trauma, does endorse some increased lifting at home.  No abd pain, fevers, chills, NV, no urinary complaints, no numbness, no incontinence.  No other complaints at this time, trying lidocaine patches without relief.

## 2021-04-12 NOTE — ED PROVIDER NOTE - PATIENT PORTAL LINK FT
You can access the FollowMyHealth Patient Portal offered by [facility] by registering at the following website: [email or web address]. By joining [facility]’s FollowMyHealth portal, you will also be able to view your health information using other applications (apps) compatible with our system.

## 2021-04-12 NOTE — ED PROVIDER NOTE - NSFOLLOWUPINSTRUCTIONS_ED_ALL_ED_FT
1- Tylenol as needed for pain  2- Valium 5 mg every 8 hours for extreme pain, you can not drive while taking this medication as it will make you tired.  Many people choose to take this before they go to bed.    3- Salon Pas patches   4- Follow up with our spine center   5- Return to ER for any new or worsening symptoms

## 2021-04-12 NOTE — ED PROVIDER NOTE - ATTENDING CONTRIBUTION TO CARE
RGUJRAL 49 yo f hx listed presents with R side back pain radiating down her leg x 2 weeks. Pt has been helping her boyfriend with ADLs and developed progressive symptoms that exacerbated today. Denies any numbness, tingling, weakness. On exam, Patient is awake,alert,oriented x 3. Patient is well appearing and in no acute distress. Patient's chest is clear to ausculation, +s1s2. Abdomen is soft nd/nt +BS. Extremity with no swelling or calf tenderness. Back no t/l tenderness. + SLT on right. NV intact.   Ddx muscle strain pain control and re eval.

## 2021-04-12 NOTE — ED PROVIDER NOTE - PROGRESS NOTE DETAILS
received sign out from Dr Davies w back pain and not red flags on exam, plan for symptomatic treatment.  plan for discharge if improved. TORSTEN I was unable to send Rx for valium due to sunrise issues, Estelle OJEDA sent on our behalf.  Michael

## 2021-04-12 NOTE — ED ADULT TRIAGE NOTE - NSWEIGHTCALCTOOLDRUG_GEN_A_CORE
Called Pt, left message stating that labs can be completed morning of appt per  Maria Isabel Wesley.     used

## 2021-04-12 NOTE — ED ADULT NURSE NOTE - OBJECTIVE STATEMENT
50 yr old female with  gastric bypass, Intussusception ,  to ED with c/o rt sided back pain x 2 weeks.  in with right sided lower back pain over the past 2 weeks.  Pain worsens with movement Denies trauma Lifts objects Denies burn or diff urinating.  is more on the right, worse with movement.  No abd pain, fevers, chills, NV, no urinary complaints.

## 2021-04-12 NOTE — ED PROVIDER NOTE - MUSCULOSKELETAL, MLM
No midline spinal tenderness, NEXUS negative  Mild right sided paravertebral tenderness in Lumbar spine .    b/l UE's with full ROM and.  b/l LE's full ROM.   + SLR on the right

## 2021-04-16 ENCOUNTER — APPOINTMENT (OUTPATIENT)
Dept: ORTHOPEDIC SURGERY | Facility: CLINIC | Age: 51
End: 2021-04-16
Payer: MEDICAID

## 2021-04-16 VITALS
DIASTOLIC BLOOD PRESSURE: 72 MMHG | HEIGHT: 67 IN | HEART RATE: 89 BPM | BODY MASS INDEX: 24.48 KG/M2 | WEIGHT: 156 LBS | SYSTOLIC BLOOD PRESSURE: 107 MMHG

## 2021-04-16 DIAGNOSIS — M54.5 LOW BACK PAIN: ICD-10-CM

## 2021-04-16 DIAGNOSIS — M51.36 OTHER INTERVERTEBRAL DISC DEGENERATION, LUMBAR REGION: ICD-10-CM

## 2021-04-16 PROCEDURE — 99204 OFFICE O/P NEW MOD 45 MIN: CPT

## 2021-04-16 PROCEDURE — 99072 ADDL SUPL MATRL&STAF TM PHE: CPT

## 2021-04-16 PROCEDURE — 72100 X-RAY EXAM L-S SPINE 2/3 VWS: CPT

## 2021-04-16 RX ORDER — TIZANIDINE 4 MG/1
4 TABLET ORAL
Qty: 30 | Refills: 1 | Status: ACTIVE | COMMUNITY
Start: 2021-04-16 | End: 1900-01-01

## 2021-04-28 ENCOUNTER — OUTPATIENT (OUTPATIENT)
Dept: OUTPATIENT SERVICES | Facility: HOSPITAL | Age: 51
LOS: 1 days | End: 2021-04-28
Payer: MEDICAID

## 2021-04-28 ENCOUNTER — APPOINTMENT (OUTPATIENT)
Dept: MAMMOGRAPHY | Facility: IMAGING CENTER | Age: 51
End: 2021-04-28
Payer: MEDICAID

## 2021-04-28 DIAGNOSIS — M12.9 ARTHROPATHY, UNSPECIFIED: Chronic | ICD-10-CM

## 2021-04-28 DIAGNOSIS — K63.9 DISEASE OF INTESTINE, UNSPECIFIED: Chronic | ICD-10-CM

## 2021-04-28 DIAGNOSIS — Z00.8 ENCOUNTER FOR OTHER GENERAL EXAMINATION: ICD-10-CM

## 2021-04-28 PROCEDURE — 77067 SCR MAMMO BI INCL CAD: CPT | Mod: 26

## 2021-04-28 PROCEDURE — 77063 BREAST TOMOSYNTHESIS BI: CPT

## 2021-04-28 PROCEDURE — 77063 BREAST TOMOSYNTHESIS BI: CPT | Mod: 26

## 2021-04-28 PROCEDURE — 77067 SCR MAMMO BI INCL CAD: CPT

## 2021-04-29 ENCOUNTER — APPOINTMENT (OUTPATIENT)
Dept: ORTHOPEDIC SURGERY | Facility: CLINIC | Age: 51
End: 2021-04-29

## 2021-05-17 ENCOUNTER — APPOINTMENT (OUTPATIENT)
Dept: ORTHOPEDIC SURGERY | Facility: CLINIC | Age: 51
End: 2021-05-17

## 2021-11-16 NOTE — PATIENT PROFILE ADULT - BRADEN MOISTURE
[facility] ambulatory encounter  FAMILY PRACTICE OFFICE VISIT    CHIEF COMPLAINT:    Chief Complaint   Patient presents with   • Office Visit      Medication Follow Up        SUBJECTIVE:  Dionicio Rubin is a 39 year old male who presented requesting evaluation for depression.  Symptoms have significantly improved since increasing fluvoxamine to 100 mg daily.  Patient recently celebrated 1 year anniversary for surviving 26 day hospitalization for COVID.  Reports no significant shortness of breath.  He has some hesitancy to proceed with exercise.  Continues to have difficulty with sleep onset maintenance.  Prior to increasing dose of fluvoxamine patient was crying on a daily basis.  His mood is stabilized.  During his hospitalization he utilize trazodone with symptomatic relief and refill will be provided today.  Chest x-ray was performed today which shows near resolution of pulmonary infiltrate that was present on 11/28/2020 at the time of his discharge.  COVID vaccination booster has been completed.  Influenza is up-to-date.    Review of systems:   All other systems are reviewed and are negative except as documented in the history of present illness.     OBJECTIVE:  PROBLEM LIST:   Patient Active Problem List   Diagnosis   • Hypercholesterolemia   • Anxiety   • Warts, genital   • Elevated liver function tests   • Obesity (BMI 30-39.9)   • Essential hypertension   • Intertrigo   • SVT (supraventricular tachycardia) (CMS/HCC)   • Mild major depression (CMS/HCC)       PAST HISTORIES:   I have reviewed the past medical history, family history, social history, medications and allergies listed in the medical record as obtained by my nursing staff and support staff and agree with their documentation.    PHYSICAL EXAM:   Vital Signs:    Visit Vitals  /70   Pulse 84   Resp 18   Ht 5' 7\" (1.702 m)   Wt 105.7 kg (233 lb)   SpO2 95%   BMI 36.49 kg/m²     Pulse Ox Interpretation:  Within normal limits.  General:   Affect was appropriate with no emotional lability.  Alert, cooperative, conversive in no acute distress.  Skin:  Warm and dry without rash.    Head:  Normocephalic, atraumatic.   Neck:  Trachea is midline.     Eyes:  Normal conjunctivae and sclerae.   ENT:  Mucous membranes are moist.   Cardiovascular:  Symmetrical pulses.  Regular rate and rhythm without murmur.  Respiratory:   Normal respiratory effort.  Clear to auscultation.  No wheezes, rales or rhonchi.  Gastrointestinal:  Soft and nontender.  Normal bowel sounds.  No hepatomegaly or splenomegaly.   Musculoskeletal:  No deformity or edema.   Back:  Normal alignment.  No costovertebral angle tenderness.  Neurologic:  Oriented x4.  No focal deficits.  Psychiatric:  Cooperative.  Appropriate mood and affect.    LAB RESULTS:   All pertinent laboratory results were reviewed.    ASSESSMENT:   1. Primary insomnia    2. Anxiety    3. Mild major depression (CMS/HCC)    4. History of 2019 novel coronavirus disease (COVID-19)        PLAN:   Orders Placed This Encounter   • XR Chest PA and Lateral   • fluvoxamine (LUVOX) 100 MG tablet   • traZODone (DESYREL) 50 MG tablet 1-2 tablets at nighttime.       Return in about 6 months (around 5/16/2022) for depression.    Instructions provided as documented in the after visit summary.    The patient indicated understanding of the diagnosis and agreed with the plan of care.        (4) rarely moist

## 2021-12-29 NOTE — ED ADULT NURSE NOTE - NS ED NURSE DISCH DISPOSITION
Discharged
Patient requests all Lab, Cardiology, and Radiology Results on their Discharge Instructions

## 2022-06-07 ENCOUNTER — NON-APPOINTMENT (OUTPATIENT)
Age: 52
End: 2022-06-07

## 2022-06-07 DIAGNOSIS — R13.19 OTHER DYSPHAGIA: ICD-10-CM

## 2022-08-09 NOTE — PATIENT PROFILE ADULT - LAST BOWEL MOVEMENT DATE
35 beats of V-tach per tele tech. Patient is asymptomatic. VSSon room air. Dr. Alfredo informed.    11-Mar-2019

## 2024-01-19 NOTE — ED PROVIDER NOTE - ABDOMINAL EXAM
Scheduled date of colonoscopy (as of today):04.05.24  Physician performing colonoscopy:DR POWELL  Location of colonoscopy:[place]  Bowel prep reviewed with patient:UMA  Instructions reviewed with patient by:CHRISS  Clearances: N/A  
tender.../soft

## 2024-08-05 NOTE — PROVIDER CONTACT NOTE (OTHER) - BACKGROUND
PRE-SEDATION ASSESSMENT    CONSENT  Consent for procedure and sedation obtained: Yes    MEDICAL HISTORY  See Chart  Past Complications with Sedation: No    PHYSICAL EXAM  Airway Anatomy : Class II - Visualization of soft palate, uvula, and fauces.  No difficulty.    OTHER FINDINGS   Per Chart    SEDATION RISK ASSESSMENT  Risk Status ASA: ASA 2: Normal patient with mild systemic disease    
pt admitted for abd pain

## 2025-01-21 NOTE — PATIENT PROFILE ADULT - PRO INTERPRETER NEED 2
Problem: Pain  Goal: Takes deep breaths with improved pain control throughout the shift  Outcome: Progressing  Goal: Turns in bed with improved pain control throughout the shift  Outcome: Progressing  Goal: Walks with improved pain control throughout the shift  Outcome: Progressing  Goal: Performs ADL's with improved pain control throughout shift  Outcome: Progressing  Goal: Participates in PT with improved pain control throughout the shift  Outcome: Progressing  Goal: Free from opioid side effects throughout the shift  Outcome: Progressing  Goal: Free from acute confusion related to pain meds throughout the shift  Outcome: Progressing     Problem: Pain - Adult  Goal: Verbalizes/displays adequate comfort level or baseline comfort level  Outcome: Progressing     Problem: Safety - Adult  Goal: Free from fall injury  Outcome: Progressing     Problem: Discharge Planning  Goal: Discharge to home or other facility with appropriate resources  Outcome: Progressing     Problem: Chronic Conditions and Co-morbidities  Goal: Patient's chronic conditions and co-morbidity symptoms are monitored and maintained or improved  Outcome: Progressing     Problem: Fall/Injury  Goal: Not fall by end of shift  Outcome: Progressing  Goal: Be free from injury by end of the shift  Outcome: Progressing  Goal: Verbalize understanding of personal risk factors for fall in the hospital  Outcome: Progressing  Goal: Verbalize understanding of risk factor reduction measures to prevent injury from fall in the home  Outcome: Progressing  Goal: Use assistive devices by end of the shift  Outcome: Progressing  Goal: Pace activities to prevent fatigue by end of the shift  Outcome: Progressing      English

## 2025-08-02 ENCOUNTER — INPATIENT (INPATIENT)
Facility: HOSPITAL | Age: 55
LOS: 2 days | Discharge: HOME | DRG: 392 | End: 2025-08-05
Attending: STUDENT IN AN ORGANIZED HEALTH CARE EDUCATION/TRAINING PROGRAM | Admitting: STUDENT IN AN ORGANIZED HEALTH CARE EDUCATION/TRAINING PROGRAM
Payer: MEDICAID

## 2025-08-02 VITALS
SYSTOLIC BLOOD PRESSURE: 120 MMHG | WEIGHT: 149.91 LBS | HEART RATE: 92 BPM | TEMPERATURE: 98 F | DIASTOLIC BLOOD PRESSURE: 80 MMHG | HEIGHT: 67 IN | RESPIRATION RATE: 16 BRPM | OXYGEN SATURATION: 98 %

## 2025-08-02 DIAGNOSIS — K63.9 DISEASE OF INTESTINE, UNSPECIFIED: Chronic | ICD-10-CM

## 2025-08-02 DIAGNOSIS — M12.9 ARTHROPATHY, UNSPECIFIED: Chronic | ICD-10-CM

## 2025-08-02 LAB
ALBUMIN SERPL ELPH-MCNC: 4.2 G/DL — SIGNIFICANT CHANGE UP (ref 3.3–5)
ALP SERPL-CCNC: 76 U/L — SIGNIFICANT CHANGE UP (ref 40–120)
ALT FLD-CCNC: 16 U/L — SIGNIFICANT CHANGE UP (ref 10–45)
ANION GAP SERPL CALC-SCNC: 15 MMOL/L — SIGNIFICANT CHANGE UP (ref 5–17)
AST SERPL-CCNC: 16 U/L — SIGNIFICANT CHANGE UP (ref 10–40)
BASOPHILS # BLD AUTO: 0.04 K/UL — SIGNIFICANT CHANGE UP (ref 0–0.2)
BASOPHILS NFR BLD AUTO: 0.6 % — SIGNIFICANT CHANGE UP (ref 0–2)
BILIRUB SERPL-MCNC: 0.5 MG/DL — SIGNIFICANT CHANGE UP (ref 0.2–1.2)
BUN SERPL-MCNC: 10 MG/DL — SIGNIFICANT CHANGE UP (ref 7–23)
CALCIUM SERPL-MCNC: 9.4 MG/DL — SIGNIFICANT CHANGE UP (ref 8.4–10.5)
CHLORIDE SERPL-SCNC: 98 MMOL/L — SIGNIFICANT CHANGE UP (ref 96–108)
CO2 SERPL-SCNC: 22 MMOL/L — SIGNIFICANT CHANGE UP (ref 22–31)
CREAT SERPL-MCNC: 0.58 MG/DL — SIGNIFICANT CHANGE UP (ref 0.5–1.3)
EGFR: 106 ML/MIN/1.73M2 — SIGNIFICANT CHANGE UP
EGFR: 106 ML/MIN/1.73M2 — SIGNIFICANT CHANGE UP
EOSINOPHIL # BLD AUTO: 0.09 K/UL — SIGNIFICANT CHANGE UP (ref 0–0.5)
EOSINOPHIL NFR BLD AUTO: 1.2 % — SIGNIFICANT CHANGE UP (ref 0–6)
GLUCOSE SERPL-MCNC: 95 MG/DL — SIGNIFICANT CHANGE UP (ref 70–99)
HCT VFR BLD CALC: 42.1 % — SIGNIFICANT CHANGE UP (ref 34.5–45)
HGB BLD-MCNC: 13.6 G/DL — SIGNIFICANT CHANGE UP (ref 11.5–15.5)
IMM GRANULOCYTES # BLD AUTO: 0.01 K/UL — SIGNIFICANT CHANGE UP (ref 0–0.07)
IMM GRANULOCYTES NFR BLD AUTO: 0.1 % — SIGNIFICANT CHANGE UP (ref 0–0.9)
LIDOCAIN IGE QN: 13 U/L — SIGNIFICANT CHANGE UP (ref 7–60)
LYMPHOCYTES # BLD AUTO: 2.07 K/UL — SIGNIFICANT CHANGE UP (ref 1–3.3)
LYMPHOCYTES NFR BLD AUTO: 28.7 % — SIGNIFICANT CHANGE UP (ref 13–44)
MCHC RBC-ENTMCNC: 27.1 PG — SIGNIFICANT CHANGE UP (ref 27–34)
MCHC RBC-ENTMCNC: 32.3 G/DL — SIGNIFICANT CHANGE UP (ref 32–36)
MCV RBC AUTO: 83.9 FL — SIGNIFICANT CHANGE UP (ref 80–100)
MONOCYTES # BLD AUTO: 1.11 K/UL — HIGH (ref 0–0.9)
MONOCYTES NFR BLD AUTO: 15.4 % — HIGH (ref 2–14)
NEUTROPHILS # BLD AUTO: 3.9 K/UL — SIGNIFICANT CHANGE UP (ref 1.8–7.4)
NEUTROPHILS NFR BLD AUTO: 54 % — SIGNIFICANT CHANGE UP (ref 43–77)
NRBC # BLD AUTO: 0 K/UL — SIGNIFICANT CHANGE UP (ref 0–0)
NRBC # FLD: 0 K/UL — SIGNIFICANT CHANGE UP (ref 0–0)
NRBC BLD AUTO-RTO: 0 /100 WBCS — SIGNIFICANT CHANGE UP (ref 0–0)
PLATELET # BLD AUTO: 202 K/UL — SIGNIFICANT CHANGE UP (ref 150–400)
PMV BLD: 11.1 FL — SIGNIFICANT CHANGE UP (ref 7–13)
POTASSIUM SERPL-MCNC: 4.6 MMOL/L — SIGNIFICANT CHANGE UP (ref 3.5–5.3)
POTASSIUM SERPL-SCNC: 4.6 MMOL/L — SIGNIFICANT CHANGE UP (ref 3.5–5.3)
PROT SERPL-MCNC: 7.1 G/DL — SIGNIFICANT CHANGE UP (ref 6–8.3)
RBC # BLD: 5.02 M/UL — SIGNIFICANT CHANGE UP (ref 3.8–5.2)
RBC # FLD: 13.1 % — SIGNIFICANT CHANGE UP (ref 10.3–14.5)
SODIUM SERPL-SCNC: 135 MMOL/L — SIGNIFICANT CHANGE UP (ref 135–145)
WBC # BLD: 7.22 K/UL — SIGNIFICANT CHANGE UP (ref 3.8–10.5)
WBC # FLD AUTO: 7.22 K/UL — SIGNIFICANT CHANGE UP (ref 3.8–10.5)

## 2025-08-02 PROCEDURE — 99285 EMERGENCY DEPT VISIT HI MDM: CPT

## 2025-08-02 PROCEDURE — 76705 ECHO EXAM OF ABDOMEN: CPT | Mod: 26

## 2025-08-02 PROCEDURE — 74177 CT ABD & PELVIS W/CONTRAST: CPT | Mod: 26

## 2025-08-02 RX ORDER — SODIUM CHLORIDE 9 G/1000ML
1000 INJECTION, SOLUTION INTRAVENOUS ONCE
Refills: 0 | Status: COMPLETED | OUTPATIENT
Start: 2025-08-02 | End: 2025-08-02

## 2025-08-02 RX ADMIN — Medication 4 MILLIGRAM(S): at 18:10

## 2025-08-02 RX ADMIN — SODIUM CHLORIDE 1000 MILLILITER(S): 9 INJECTION, SOLUTION INTRAVENOUS at 18:10

## 2025-08-03 DIAGNOSIS — R10.9 UNSPECIFIED ABDOMINAL PAIN: ICD-10-CM

## 2025-08-03 DIAGNOSIS — K83.9 DISEASE OF BILIARY TRACT, UNSPECIFIED: ICD-10-CM

## 2025-08-03 DIAGNOSIS — R11.2 NAUSEA WITH VOMITING, UNSPECIFIED: ICD-10-CM

## 2025-08-03 DIAGNOSIS — K52.9 NONINFECTIVE GASTROENTERITIS AND COLITIS, UNSPECIFIED: ICD-10-CM

## 2025-08-03 DIAGNOSIS — R13.10 DYSPHAGIA, UNSPECIFIED: ICD-10-CM

## 2025-08-03 LAB
ALBUMIN SERPL ELPH-MCNC: 3.9 G/DL — SIGNIFICANT CHANGE UP (ref 3.3–5)
ALP SERPL-CCNC: 66 U/L — SIGNIFICANT CHANGE UP (ref 40–120)
ALT FLD-CCNC: 12 U/L — SIGNIFICANT CHANGE UP (ref 10–45)
ANION GAP SERPL CALC-SCNC: 11 MMOL/L — SIGNIFICANT CHANGE UP (ref 5–17)
APPEARANCE UR: CLEAR — SIGNIFICANT CHANGE UP
APTT BLD: 32.9 SEC — SIGNIFICANT CHANGE UP (ref 26.1–36.8)
AST SERPL-CCNC: 16 U/L — SIGNIFICANT CHANGE UP (ref 10–40)
BACTERIA # UR AUTO: NEGATIVE /HPF — SIGNIFICANT CHANGE UP
BASOPHILS # BLD AUTO: 0.03 K/UL — SIGNIFICANT CHANGE UP (ref 0–0.2)
BASOPHILS NFR BLD AUTO: 0.6 % — SIGNIFICANT CHANGE UP (ref 0–2)
BILIRUB SERPL-MCNC: 0.5 MG/DL — SIGNIFICANT CHANGE UP (ref 0.2–1.2)
BILIRUB UR-MCNC: NEGATIVE — SIGNIFICANT CHANGE UP
BUN SERPL-MCNC: 7 MG/DL — SIGNIFICANT CHANGE UP (ref 7–23)
CALCIUM SERPL-MCNC: 9.3 MG/DL — SIGNIFICANT CHANGE UP (ref 8.4–10.5)
CAST: 0 /LPF — SIGNIFICANT CHANGE UP (ref 0–4)
CHLORIDE SERPL-SCNC: 102 MMOL/L — SIGNIFICANT CHANGE UP (ref 96–108)
CO2 SERPL-SCNC: 26 MMOL/L — SIGNIFICANT CHANGE UP (ref 22–31)
COLOR SPEC: YELLOW — SIGNIFICANT CHANGE UP
CREAT SERPL-MCNC: 0.51 MG/DL — SIGNIFICANT CHANGE UP (ref 0.5–1.3)
DIFF PNL FLD: ABNORMAL
EGFR: 109 ML/MIN/1.73M2 — SIGNIFICANT CHANGE UP
EGFR: 109 ML/MIN/1.73M2 — SIGNIFICANT CHANGE UP
EOSINOPHIL # BLD AUTO: 0.09 K/UL — SIGNIFICANT CHANGE UP (ref 0–0.5)
EOSINOPHIL NFR BLD AUTO: 1.8 % — SIGNIFICANT CHANGE UP (ref 0–6)
GLUCOSE BLDC GLUCOMTR-MCNC: 102 MG/DL — HIGH (ref 70–99)
GLUCOSE BLDC GLUCOMTR-MCNC: 122 MG/DL — HIGH (ref 70–99)
GLUCOSE BLDC GLUCOMTR-MCNC: 47 MG/DL — CRITICAL LOW (ref 70–99)
GLUCOSE BLDC GLUCOMTR-MCNC: 53 MG/DL — CRITICAL LOW (ref 70–99)
GLUCOSE SERPL-MCNC: 79 MG/DL — SIGNIFICANT CHANGE UP (ref 70–99)
GLUCOSE UR QL: NEGATIVE MG/DL — SIGNIFICANT CHANGE UP
HCT VFR BLD CALC: 38.9 % — SIGNIFICANT CHANGE UP (ref 34.5–45)
HGB BLD-MCNC: 12.3 G/DL — SIGNIFICANT CHANGE UP (ref 11.5–15.5)
IMM GRANULOCYTES # BLD AUTO: 0.02 K/UL — SIGNIFICANT CHANGE UP (ref 0–0.07)
IMM GRANULOCYTES NFR BLD AUTO: 0.4 % — SIGNIFICANT CHANGE UP (ref 0–0.9)
INR BLD: 1.19 RATIO — HIGH (ref 0.85–1.16)
KETONES UR QL: ABNORMAL MG/DL
LEUKOCYTE ESTERASE UR-ACNC: NEGATIVE — SIGNIFICANT CHANGE UP
LYMPHOCYTES # BLD AUTO: 1.69 K/UL — SIGNIFICANT CHANGE UP (ref 1–3.3)
LYMPHOCYTES NFR BLD AUTO: 33.9 % — SIGNIFICANT CHANGE UP (ref 13–44)
MAGNESIUM SERPL-MCNC: 2 MG/DL — SIGNIFICANT CHANGE UP (ref 1.6–2.6)
MCHC RBC-ENTMCNC: 27.1 PG — SIGNIFICANT CHANGE UP (ref 27–34)
MCHC RBC-ENTMCNC: 31.6 G/DL — LOW (ref 32–36)
MCV RBC AUTO: 85.7 FL — SIGNIFICANT CHANGE UP (ref 80–100)
MONOCYTES # BLD AUTO: 0.57 K/UL — SIGNIFICANT CHANGE UP (ref 0–0.9)
MONOCYTES NFR BLD AUTO: 11.4 % — SIGNIFICANT CHANGE UP (ref 2–14)
NEUTROPHILS # BLD AUTO: 2.58 K/UL — SIGNIFICANT CHANGE UP (ref 1.8–7.4)
NEUTROPHILS NFR BLD AUTO: 51.9 % — SIGNIFICANT CHANGE UP (ref 43–77)
NITRITE UR-MCNC: NEGATIVE — SIGNIFICANT CHANGE UP
NRBC # BLD AUTO: 0 K/UL — SIGNIFICANT CHANGE UP (ref 0–0)
NRBC # FLD: 0 K/UL — SIGNIFICANT CHANGE UP (ref 0–0)
NRBC BLD AUTO-RTO: 0 /100 WBCS — SIGNIFICANT CHANGE UP (ref 0–0)
PH UR: 6 — SIGNIFICANT CHANGE UP (ref 5–8)
PHOSPHATE SERPL-MCNC: 4.1 MG/DL — SIGNIFICANT CHANGE UP (ref 2.5–4.5)
PLATELET # BLD AUTO: 158 K/UL — SIGNIFICANT CHANGE UP (ref 150–400)
PMV BLD: 10.6 FL — SIGNIFICANT CHANGE UP (ref 7–13)
POTASSIUM SERPL-MCNC: 4.4 MMOL/L — SIGNIFICANT CHANGE UP (ref 3.5–5.3)
POTASSIUM SERPL-SCNC: 4.4 MMOL/L — SIGNIFICANT CHANGE UP (ref 3.5–5.3)
PROT SERPL-MCNC: 6.3 G/DL — SIGNIFICANT CHANGE UP (ref 6–8.3)
PROT UR-MCNC: NEGATIVE MG/DL — SIGNIFICANT CHANGE UP
PROTHROM AB SERPL-ACNC: 13.7 SEC — HIGH (ref 9.9–13.4)
RBC # BLD: 4.54 M/UL — SIGNIFICANT CHANGE UP (ref 3.8–5.2)
RBC # FLD: 13.2 % — SIGNIFICANT CHANGE UP (ref 10.3–14.5)
RBC CASTS # UR COMP ASSIST: 6 /HPF — HIGH (ref 0–4)
SODIUM SERPL-SCNC: 139 MMOL/L — SIGNIFICANT CHANGE UP (ref 135–145)
SP GR SPEC: >1.03 — HIGH (ref 1–1.03)
SQUAMOUS # UR AUTO: 2 /HPF — SIGNIFICANT CHANGE UP (ref 0–5)
UROBILINOGEN FLD QL: 0.2 MG/DL — SIGNIFICANT CHANGE UP (ref 0.2–1)
WBC # BLD: 4.98 K/UL — SIGNIFICANT CHANGE UP (ref 3.8–10.5)
WBC # FLD AUTO: 4.98 K/UL — SIGNIFICANT CHANGE UP (ref 3.8–10.5)
WBC UR QL: 2 /HPF — SIGNIFICANT CHANGE UP (ref 0–5)

## 2025-08-03 PROCEDURE — 85018 HEMOGLOBIN: CPT

## 2025-08-03 PROCEDURE — A9585: CPT

## 2025-08-03 PROCEDURE — 82803 BLOOD GASES ANY COMBINATION: CPT

## 2025-08-03 PROCEDURE — 99223 1ST HOSP IP/OBS HIGH 75: CPT

## 2025-08-03 PROCEDURE — 83605 ASSAY OF LACTIC ACID: CPT

## 2025-08-03 PROCEDURE — 85014 HEMATOCRIT: CPT

## 2025-08-03 PROCEDURE — 83735 ASSAY OF MAGNESIUM: CPT

## 2025-08-03 PROCEDURE — 82947 ASSAY GLUCOSE BLOOD QUANT: CPT

## 2025-08-03 PROCEDURE — 74183 MRI ABD W/O CNTR FLWD CNTR: CPT

## 2025-08-03 PROCEDURE — 99284 EMERGENCY DEPT VISIT MOD MDM: CPT | Mod: GC

## 2025-08-03 PROCEDURE — 82330 ASSAY OF CALCIUM: CPT

## 2025-08-03 PROCEDURE — 85025 COMPLETE CBC W/AUTO DIFF WBC: CPT

## 2025-08-03 PROCEDURE — 83690 ASSAY OF LIPASE: CPT

## 2025-08-03 PROCEDURE — 84100 ASSAY OF PHOSPHORUS: CPT

## 2025-08-03 PROCEDURE — 84132 ASSAY OF SERUM POTASSIUM: CPT

## 2025-08-03 PROCEDURE — 82435 ASSAY OF BLOOD CHLORIDE: CPT

## 2025-08-03 PROCEDURE — 74183 MRI ABD W/O CNTR FLWD CNTR: CPT | Mod: 26

## 2025-08-03 PROCEDURE — 85610 PROTHROMBIN TIME: CPT

## 2025-08-03 PROCEDURE — 99222 1ST HOSP IP/OBS MODERATE 55: CPT | Mod: GC

## 2025-08-03 PROCEDURE — 80053 COMPREHEN METABOLIC PANEL: CPT

## 2025-08-03 PROCEDURE — 84295 ASSAY OF SERUM SODIUM: CPT

## 2025-08-03 PROCEDURE — 81001 URINALYSIS AUTO W/SCOPE: CPT

## 2025-08-03 PROCEDURE — 74177 CT ABD & PELVIS W/CONTRAST: CPT

## 2025-08-03 PROCEDURE — 85730 THROMBOPLASTIN TIME PARTIAL: CPT

## 2025-08-03 PROCEDURE — 76705 ECHO EXAM OF ABDOMEN: CPT

## 2025-08-03 RX ORDER — DEXTROSE 50 % IN WATER 50 %
15 SYRINGE (ML) INTRAVENOUS ONCE
Refills: 0 | Status: DISCONTINUED | OUTPATIENT
Start: 2025-08-03 | End: 2025-08-04

## 2025-08-03 RX ORDER — SODIUM CHLORIDE 9 G/1000ML
1000 INJECTION, SOLUTION INTRAVENOUS
Refills: 0 | Status: DISCONTINUED | OUTPATIENT
Start: 2025-08-03 | End: 2025-08-05

## 2025-08-03 RX ORDER — DEXTROSE 50 % IN WATER 50 %
12.5 SYRINGE (ML) INTRAVENOUS ONCE
Refills: 0 | Status: DISCONTINUED | OUTPATIENT
Start: 2025-08-03 | End: 2025-08-05

## 2025-08-03 RX ORDER — DEXTROSE 50 % IN WATER 50 %
25 SYRINGE (ML) INTRAVENOUS ONCE
Refills: 0 | Status: DISCONTINUED | OUTPATIENT
Start: 2025-08-03 | End: 2025-08-05

## 2025-08-03 RX ORDER — SODIUM CHLORIDE 9 G/1000ML
1000 INJECTION, SOLUTION INTRAVENOUS
Refills: 0 | Status: DISCONTINUED | OUTPATIENT
Start: 2025-08-03 | End: 2025-08-04

## 2025-08-03 RX ORDER — ACETAMINOPHEN 500 MG/5ML
1000 LIQUID (ML) ORAL ONCE
Refills: 0 | Status: COMPLETED | OUTPATIENT
Start: 2025-08-03 | End: 2025-08-03

## 2025-08-03 RX ORDER — DEXTROSE 50 % IN WATER 50 %
25 SYRINGE (ML) INTRAVENOUS ONCE
Refills: 0 | Status: COMPLETED | OUTPATIENT
Start: 2025-08-03 | End: 2025-08-03

## 2025-08-03 RX ORDER — SIMETHICONE 80 MG
80 TABLET,CHEWABLE ORAL EVERY 6 HOURS
Refills: 0 | Status: DISCONTINUED | OUTPATIENT
Start: 2025-08-03 | End: 2025-08-05

## 2025-08-03 RX ORDER — OMEPRAZOLE 20 MG/1
1 CAPSULE, DELAYED RELEASE ORAL
Refills: 0 | DISCHARGE

## 2025-08-03 RX ORDER — GLUCAGON 3 MG/1
1 POWDER NASAL ONCE
Refills: 0 | Status: DISCONTINUED | OUTPATIENT
Start: 2025-08-03 | End: 2025-08-05

## 2025-08-03 RX ADMIN — Medication 40 MILLIGRAM(S): at 17:50

## 2025-08-03 RX ADMIN — SODIUM CHLORIDE 75 MILLILITER(S): 9 INJECTION, SOLUTION INTRAVENOUS at 23:07

## 2025-08-03 RX ADMIN — Medication 40 MILLIGRAM(S): at 05:47

## 2025-08-03 RX ADMIN — Medication 80 MILLIGRAM(S): at 20:51

## 2025-08-03 RX ADMIN — Medication 100 MILLIGRAM(S): at 00:59

## 2025-08-03 RX ADMIN — Medication 2 MILLIGRAM(S): at 05:47

## 2025-08-03 RX ADMIN — Medication 400 MILLIGRAM(S): at 03:43

## 2025-08-04 ENCOUNTER — RESULT REVIEW (OUTPATIENT)
Age: 55
End: 2025-08-04

## 2025-08-04 DIAGNOSIS — Z29.9 ENCOUNTER FOR PROPHYLACTIC MEASURES, UNSPECIFIED: ICD-10-CM

## 2025-08-04 LAB
A1C WITH ESTIMATED AVERAGE GLUCOSE RESULT: 5.5 % — SIGNIFICANT CHANGE UP (ref 4–5.6)
ALBUMIN SERPL ELPH-MCNC: 4.3 G/DL — SIGNIFICANT CHANGE UP (ref 3.3–5)
ALP SERPL-CCNC: 73 U/L — SIGNIFICANT CHANGE UP (ref 40–120)
ALT FLD-CCNC: 11 U/L — SIGNIFICANT CHANGE UP (ref 10–45)
ANION GAP SERPL CALC-SCNC: 12 MMOL/L — SIGNIFICANT CHANGE UP (ref 5–17)
AST SERPL-CCNC: 14 U/L — SIGNIFICANT CHANGE UP (ref 10–40)
BILIRUB SERPL-MCNC: 0.3 MG/DL — SIGNIFICANT CHANGE UP (ref 0.2–1.2)
BUN SERPL-MCNC: 8 MG/DL — SIGNIFICANT CHANGE UP (ref 7–23)
CALCIUM SERPL-MCNC: 10 MG/DL — SIGNIFICANT CHANGE UP (ref 8.4–10.5)
CHLORIDE SERPL-SCNC: 104 MMOL/L — SIGNIFICANT CHANGE UP (ref 96–108)
CO2 SERPL-SCNC: 25 MMOL/L — SIGNIFICANT CHANGE UP (ref 22–31)
CREAT SERPL-MCNC: 0.52 MG/DL — SIGNIFICANT CHANGE UP (ref 0.5–1.3)
EGFR: 109 ML/MIN/1.73M2 — SIGNIFICANT CHANGE UP
EGFR: 109 ML/MIN/1.73M2 — SIGNIFICANT CHANGE UP
ESTIMATED AVERAGE GLUCOSE: 111 MG/DL — SIGNIFICANT CHANGE UP (ref 68–114)
GLUCOSE BLDC GLUCOMTR-MCNC: 102 MG/DL — HIGH (ref 70–99)
GLUCOSE BLDC GLUCOMTR-MCNC: 118 MG/DL — HIGH (ref 70–99)
GLUCOSE BLDC GLUCOMTR-MCNC: 145 MG/DL — HIGH (ref 70–99)
GLUCOSE BLDC GLUCOMTR-MCNC: 82 MG/DL — SIGNIFICANT CHANGE UP (ref 70–99)
GLUCOSE BLDC GLUCOMTR-MCNC: 90 MG/DL — SIGNIFICANT CHANGE UP (ref 70–99)
GLUCOSE SERPL-MCNC: 84 MG/DL — SIGNIFICANT CHANGE UP (ref 70–99)
HCT VFR BLD CALC: 42.8 % — SIGNIFICANT CHANGE UP (ref 34.5–45)
HGB BLD-MCNC: 13.7 G/DL — SIGNIFICANT CHANGE UP (ref 11.5–15.5)
MCHC RBC-ENTMCNC: 27.1 PG — SIGNIFICANT CHANGE UP (ref 27–34)
MCHC RBC-ENTMCNC: 32 G/DL — SIGNIFICANT CHANGE UP (ref 32–36)
MCV RBC AUTO: 84.6 FL — SIGNIFICANT CHANGE UP (ref 80–100)
NRBC # BLD AUTO: 0 K/UL — SIGNIFICANT CHANGE UP (ref 0–0)
NRBC # FLD: 0 K/UL — SIGNIFICANT CHANGE UP (ref 0–0)
NRBC BLD AUTO-RTO: 0 /100 WBCS — SIGNIFICANT CHANGE UP (ref 0–0)
PLATELET # BLD AUTO: 221 K/UL — SIGNIFICANT CHANGE UP (ref 150–400)
PMV BLD: 11 FL — SIGNIFICANT CHANGE UP (ref 7–13)
POTASSIUM SERPL-MCNC: 4.5 MMOL/L — SIGNIFICANT CHANGE UP (ref 3.5–5.3)
POTASSIUM SERPL-SCNC: 4.5 MMOL/L — SIGNIFICANT CHANGE UP (ref 3.5–5.3)
PROT SERPL-MCNC: 7.1 G/DL — SIGNIFICANT CHANGE UP (ref 6–8.3)
RBC # BLD: 5.06 M/UL — SIGNIFICANT CHANGE UP (ref 3.8–5.2)
RBC # FLD: 13 % — SIGNIFICANT CHANGE UP (ref 10.3–14.5)
SODIUM SERPL-SCNC: 141 MMOL/L — SIGNIFICANT CHANGE UP (ref 135–145)
WBC # BLD: 6.55 K/UL — SIGNIFICANT CHANGE UP (ref 3.8–10.5)
WBC # FLD AUTO: 6.55 K/UL — SIGNIFICANT CHANGE UP (ref 3.8–10.5)

## 2025-08-04 PROCEDURE — 74177 CT ABD & PELVIS W/CONTRAST: CPT

## 2025-08-04 PROCEDURE — 82947 ASSAY GLUCOSE BLOOD QUANT: CPT

## 2025-08-04 PROCEDURE — 83605 ASSAY OF LACTIC ACID: CPT

## 2025-08-04 PROCEDURE — 85025 COMPLETE CBC W/AUTO DIFF WBC: CPT

## 2025-08-04 PROCEDURE — 99233 SBSQ HOSP IP/OBS HIGH 50: CPT | Mod: GC

## 2025-08-04 PROCEDURE — 82435 ASSAY OF BLOOD CHLORIDE: CPT

## 2025-08-04 PROCEDURE — 83735 ASSAY OF MAGNESIUM: CPT

## 2025-08-04 PROCEDURE — 99232 SBSQ HOSP IP/OBS MODERATE 35: CPT

## 2025-08-04 PROCEDURE — 85730 THROMBOPLASTIN TIME PARTIAL: CPT

## 2025-08-04 PROCEDURE — 88305 TISSUE EXAM BY PATHOLOGIST: CPT | Mod: 26

## 2025-08-04 PROCEDURE — 85610 PROTHROMBIN TIME: CPT

## 2025-08-04 PROCEDURE — 84295 ASSAY OF SERUM SODIUM: CPT

## 2025-08-04 PROCEDURE — 83690 ASSAY OF LIPASE: CPT

## 2025-08-04 PROCEDURE — 84132 ASSAY OF SERUM POTASSIUM: CPT

## 2025-08-04 PROCEDURE — 85018 HEMOGLOBIN: CPT

## 2025-08-04 PROCEDURE — 81001 URINALYSIS AUTO W/SCOPE: CPT

## 2025-08-04 PROCEDURE — A9585: CPT

## 2025-08-04 PROCEDURE — 84100 ASSAY OF PHOSPHORUS: CPT

## 2025-08-04 PROCEDURE — 82330 ASSAY OF CALCIUM: CPT

## 2025-08-04 PROCEDURE — 83036 HEMOGLOBIN GLYCOSYLATED A1C: CPT

## 2025-08-04 PROCEDURE — 85027 COMPLETE CBC AUTOMATED: CPT

## 2025-08-04 PROCEDURE — 80053 COMPREHEN METABOLIC PANEL: CPT

## 2025-08-04 PROCEDURE — 82962 GLUCOSE BLOOD TEST: CPT

## 2025-08-04 PROCEDURE — 82803 BLOOD GASES ANY COMBINATION: CPT

## 2025-08-04 PROCEDURE — 76705 ECHO EXAM OF ABDOMEN: CPT

## 2025-08-04 PROCEDURE — 74183 MRI ABD W/O CNTR FLWD CNTR: CPT

## 2025-08-04 PROCEDURE — 85014 HEMATOCRIT: CPT

## 2025-08-04 PROCEDURE — 43239 EGD BIOPSY SINGLE/MULTIPLE: CPT

## 2025-08-04 RX ADMIN — Medication 80 MILLIGRAM(S): at 23:09

## 2025-08-04 RX ADMIN — Medication 30 MILLILITER(S): at 14:56

## 2025-08-04 RX ADMIN — Medication 40 MILLIGRAM(S): at 05:10

## 2025-08-04 RX ADMIN — Medication 40 MILLIGRAM(S): at 17:15

## 2025-08-05 ENCOUNTER — TRANSCRIPTION ENCOUNTER (OUTPATIENT)
Age: 55
End: 2025-08-05

## 2025-08-05 VITALS
RESPIRATION RATE: 18 BRPM | DIASTOLIC BLOOD PRESSURE: 79 MMHG | SYSTOLIC BLOOD PRESSURE: 126 MMHG | OXYGEN SATURATION: 100 % | HEART RATE: 68 BPM | TEMPERATURE: 98 F

## 2025-08-05 LAB
ALBUMIN SERPL ELPH-MCNC: 3.5 G/DL — SIGNIFICANT CHANGE UP (ref 3.3–5)
ALP SERPL-CCNC: 59 U/L — SIGNIFICANT CHANGE UP (ref 40–120)
ALT FLD-CCNC: 8 U/L — LOW (ref 10–45)
ANION GAP SERPL CALC-SCNC: 12 MMOL/L — SIGNIFICANT CHANGE UP (ref 5–17)
AST SERPL-CCNC: 13 U/L — SIGNIFICANT CHANGE UP (ref 10–40)
BILIRUB SERPL-MCNC: 0.1 MG/DL — LOW (ref 0.2–1.2)
BUN SERPL-MCNC: 11 MG/DL — SIGNIFICANT CHANGE UP (ref 7–23)
CALCIUM SERPL-MCNC: 8.8 MG/DL — SIGNIFICANT CHANGE UP (ref 8.4–10.5)
CHLORIDE SERPL-SCNC: 106 MMOL/L — SIGNIFICANT CHANGE UP (ref 96–108)
CO2 SERPL-SCNC: 22 MMOL/L — SIGNIFICANT CHANGE UP (ref 22–31)
CREAT SERPL-MCNC: 0.49 MG/DL — LOW (ref 0.5–1.3)
EGFR: 111 ML/MIN/1.73M2 — SIGNIFICANT CHANGE UP
EGFR: 111 ML/MIN/1.73M2 — SIGNIFICANT CHANGE UP
EPEC DNA STL QL NAA+PROBE: DETECTED
GI PCR PANEL: DETECTED
GLUCOSE SERPL-MCNC: 103 MG/DL — HIGH (ref 70–99)
HCT VFR BLD CALC: 35.5 % — SIGNIFICANT CHANGE UP (ref 34.5–45)
HGB BLD-MCNC: 11.4 G/DL — LOW (ref 11.5–15.5)
MAGNESIUM SERPL-MCNC: 1.9 MG/DL — SIGNIFICANT CHANGE UP (ref 1.6–2.6)
MCHC RBC-ENTMCNC: 27.3 PG — SIGNIFICANT CHANGE UP (ref 27–34)
MCHC RBC-ENTMCNC: 32.1 G/DL — SIGNIFICANT CHANGE UP (ref 32–36)
MCV RBC AUTO: 84.9 FL — SIGNIFICANT CHANGE UP (ref 80–100)
NRBC # BLD AUTO: 0 K/UL — SIGNIFICANT CHANGE UP (ref 0–0)
NRBC # FLD: 0 K/UL — SIGNIFICANT CHANGE UP (ref 0–0)
NRBC BLD AUTO-RTO: 0 /100 WBCS — SIGNIFICANT CHANGE UP (ref 0–0)
PHOSPHATE SERPL-MCNC: 3.9 MG/DL — SIGNIFICANT CHANGE UP (ref 2.5–4.5)
PLATELET # BLD AUTO: 184 K/UL — SIGNIFICANT CHANGE UP (ref 150–400)
PMV BLD: 11 FL — SIGNIFICANT CHANGE UP (ref 7–13)
POTASSIUM SERPL-MCNC: 3.5 MMOL/L — SIGNIFICANT CHANGE UP (ref 3.5–5.3)
POTASSIUM SERPL-SCNC: 3.5 MMOL/L — SIGNIFICANT CHANGE UP (ref 3.5–5.3)
PROT SERPL-MCNC: 5.8 G/DL — LOW (ref 6–8.3)
RBC # BLD: 4.18 M/UL — SIGNIFICANT CHANGE UP (ref 3.8–5.2)
RBC # FLD: 13 % — SIGNIFICANT CHANGE UP (ref 10.3–14.5)
SODIUM SERPL-SCNC: 140 MMOL/L — SIGNIFICANT CHANGE UP (ref 135–145)
WBC # BLD: 5.64 K/UL — SIGNIFICANT CHANGE UP (ref 3.8–10.5)
WBC # FLD AUTO: 5.64 K/UL — SIGNIFICANT CHANGE UP (ref 3.8–10.5)

## 2025-08-05 PROCEDURE — 87507 IADNA-DNA/RNA PROBE TQ 12-25: CPT

## 2025-08-05 PROCEDURE — 84132 ASSAY OF SERUM POTASSIUM: CPT

## 2025-08-05 PROCEDURE — 82947 ASSAY GLUCOSE BLOOD QUANT: CPT

## 2025-08-05 PROCEDURE — 82435 ASSAY OF BLOOD CHLORIDE: CPT

## 2025-08-05 PROCEDURE — 74183 MRI ABD W/O CNTR FLWD CNTR: CPT

## 2025-08-05 PROCEDURE — 85018 HEMOGLOBIN: CPT

## 2025-08-05 PROCEDURE — 80053 COMPREHEN METABOLIC PANEL: CPT

## 2025-08-05 PROCEDURE — 82803 BLOOD GASES ANY COMBINATION: CPT

## 2025-08-05 PROCEDURE — 85025 COMPLETE CBC W/AUTO DIFF WBC: CPT

## 2025-08-05 PROCEDURE — 85610 PROTHROMBIN TIME: CPT

## 2025-08-05 PROCEDURE — 76705 ECHO EXAM OF ABDOMEN: CPT

## 2025-08-05 PROCEDURE — 85014 HEMATOCRIT: CPT

## 2025-08-05 PROCEDURE — 84100 ASSAY OF PHOSPHORUS: CPT

## 2025-08-05 PROCEDURE — 84295 ASSAY OF SERUM SODIUM: CPT

## 2025-08-05 PROCEDURE — 85027 COMPLETE CBC AUTOMATED: CPT

## 2025-08-05 PROCEDURE — 81001 URINALYSIS AUTO W/SCOPE: CPT

## 2025-08-05 PROCEDURE — 74177 CT ABD & PELVIS W/CONTRAST: CPT

## 2025-08-05 PROCEDURE — 82962 GLUCOSE BLOOD TEST: CPT

## 2025-08-05 PROCEDURE — A9585: CPT

## 2025-08-05 PROCEDURE — 87045 FECES CULTURE AEROBIC BACT: CPT

## 2025-08-05 PROCEDURE — 87077 CULTURE AEROBIC IDENTIFY: CPT

## 2025-08-05 PROCEDURE — 87046 STOOL CULTR AEROBIC BACT EA: CPT

## 2025-08-05 PROCEDURE — 88305 TISSUE EXAM BY PATHOLOGIST: CPT

## 2025-08-05 PROCEDURE — 83690 ASSAY OF LIPASE: CPT

## 2025-08-05 PROCEDURE — 82330 ASSAY OF CALCIUM: CPT

## 2025-08-05 PROCEDURE — 99239 HOSP IP/OBS DSCHRG MGMT >30: CPT

## 2025-08-05 PROCEDURE — 85730 THROMBOPLASTIN TIME PARTIAL: CPT

## 2025-08-05 PROCEDURE — 99285 EMERGENCY DEPT VISIT HI MDM: CPT | Mod: 25

## 2025-08-05 PROCEDURE — 83735 ASSAY OF MAGNESIUM: CPT

## 2025-08-05 PROCEDURE — 83036 HEMOGLOBIN GLYCOSYLATED A1C: CPT

## 2025-08-05 PROCEDURE — 83605 ASSAY OF LACTIC ACID: CPT

## 2025-08-05 PROCEDURE — 96374 THER/PROPH/DIAG INJ IV PUSH: CPT

## 2025-08-05 RX ORDER — HYDROCORTISONE 10 MG/G
1 CREAM TOPICAL
Qty: 1 | Refills: 0
Start: 2025-08-05 | End: 2025-08-18

## 2025-08-05 RX ORDER — LIDOCAINE HYDROCHLORIDE 20 MG/ML
1 JELLY TOPICAL
Qty: 1 | Refills: 0
Start: 2025-08-05 | End: 2025-08-18

## 2025-08-05 RX ORDER — LIDOCAINE HYDROCHLORIDE 20 MG/ML
10 JELLY TOPICAL
Qty: 420 | Refills: 0
Start: 2025-08-05 | End: 2025-08-18

## 2025-08-05 RX ADMIN — Medication 40 MILLIGRAM(S): at 05:52

## 2025-08-06 LAB
CULTURE RESULTS: SIGNIFICANT CHANGE UP
SPECIMEN SOURCE: SIGNIFICANT CHANGE UP

## 2025-08-07 LAB — SURGICAL PATHOLOGY STUDY: SIGNIFICANT CHANGE UP

## 2025-09-08 ENCOUNTER — NON-APPOINTMENT (OUTPATIENT)
Age: 55
End: 2025-09-08

## (undated) DEVICE — SENSOR O2 FINGER ADULT

## (undated) DEVICE — SYR ALLIANCE II INFLATION 60ML

## (undated) DEVICE — BALLOON US ENDO

## (undated) DEVICE — FOLEY HOLDER STATLOCK 2 WAY ADULT

## (undated) DEVICE — TUBING IV SET GRAVITY 3Y 100" MACRO

## (undated) DEVICE — TUBING SUCTION CONN 6FT STERILE

## (undated) DEVICE — TUBING SUCTION 20FT

## (undated) DEVICE — BITE BLOCK ADULT 20 X 27MM (GREEN)

## (undated) DEVICE — BIOPSY FORCEP RADIAL JAW 4 STANDARD WITH NEEDLE

## (undated) DEVICE — CATH IV SAFE BC 22G X 1" (BLUE)

## (undated) DEVICE — CATH IV SAFE BC 20G X 1.16" (PINK)

## (undated) DEVICE — PACK IV START WITH CHG

## (undated) DEVICE — SUCTION YANKAUER NO CONTROL VENT

## (undated) DEVICE — SOL INJ NS 0.9% 500ML 2 PORT